# Patient Record
Sex: FEMALE | Race: WHITE | NOT HISPANIC OR LATINO | Employment: UNEMPLOYED | URBAN - METROPOLITAN AREA
[De-identification: names, ages, dates, MRNs, and addresses within clinical notes are randomized per-mention and may not be internally consistent; named-entity substitution may affect disease eponyms.]

---

## 2018-07-12 ENCOUNTER — HOSPITAL ENCOUNTER (EMERGENCY)
Facility: HOSPITAL | Age: 43
Discharge: HOME | End: 2018-07-12
Attending: EMERGENCY MEDICINE
Payer: COMMERCIAL

## 2018-07-12 VITALS
RESPIRATION RATE: 20 BRPM | WEIGHT: 180 LBS | TEMPERATURE: 99 F | HEART RATE: 78 BPM | SYSTOLIC BLOOD PRESSURE: 116 MMHG | OXYGEN SATURATION: 99 % | DIASTOLIC BLOOD PRESSURE: 67 MMHG

## 2018-07-12 DIAGNOSIS — R07.9 CHEST PAIN: ICD-10-CM

## 2018-07-12 DIAGNOSIS — O20.0 THREATENED MISCARRIAGE IN EARLY PREGNANCY: Primary | ICD-10-CM

## 2018-07-12 LAB
ABO + RH BLD: NORMAL
ALBUMIN SERPL BCP-MCNC: 4.1 G/DL
ALP SERPL-CCNC: 63 U/L
ALT SERPL W/O P-5'-P-CCNC: 19 U/L
ANION GAP SERPL CALC-SCNC: 10 MMOL/L
AST SERPL-CCNC: 17 U/L
B-HCG UR QL: POSITIVE
BACTERIA #/AREA URNS HPF: ABNORMAL /HPF
BASOPHILS # BLD AUTO: 0.1 K/UL
BASOPHILS NFR BLD: 1 %
BILIRUB SERPL-MCNC: 0.4 MG/DL
BILIRUB UR QL STRIP: ABNORMAL
BUN SERPL-MCNC: 7 MG/DL
CALCIUM SERPL-MCNC: 9 MG/DL
CHLORIDE SERPL-SCNC: 107 MMOL/L
CLARITY UR: ABNORMAL
CO2 SERPL-SCNC: 23 MMOL/L
COLOR UR: ABNORMAL
CREAT SERPL-MCNC: 0.8 MG/DL
CTP QC/QA: YES
DIFFERENTIAL METHOD: ABNORMAL
EOSINOPHIL # BLD AUTO: 0 K/UL
EOSINOPHIL NFR BLD: 0.3 %
ERYTHROCYTE [DISTWIDTH] IN BLOOD BY AUTOMATED COUNT: 17.8 %
EST. GFR  (AFRICAN AMERICAN): >60 ML/MIN/1.73 M^2
EST. GFR  (NON AFRICAN AMERICAN): >60 ML/MIN/1.73 M^2
GLUCOSE SERPL-MCNC: 85 MG/DL
GLUCOSE UR QL STRIP: ABNORMAL
HCG INTACT+B SERPL-ACNC: 923 MIU/ML
HCT VFR BLD AUTO: 39.3 %
HGB BLD-MCNC: 12.6 G/DL
HGB UR QL STRIP: ABNORMAL
HYALINE CASTS #/AREA URNS LPF: 0 /LPF
KETONES UR QL STRIP: ABNORMAL
LEUKOCYTE ESTERASE UR QL STRIP: ABNORMAL
LYMPHOCYTES # BLD AUTO: 1.8 K/UL
LYMPHOCYTES NFR BLD: 23.8 %
MCH RBC QN AUTO: 28.5 PG
MCHC RBC AUTO-ENTMCNC: 32.2 G/DL
MCV RBC AUTO: 89 FL
MICROSCOPIC COMMENT: ABNORMAL
MONOCYTES # BLD AUTO: 0.3 K/UL
MONOCYTES NFR BLD: 4.5 %
NEUTROPHILS # BLD AUTO: 5.4 K/UL
NEUTROPHILS NFR BLD: 70.4 %
NITRITE UR QL STRIP: ABNORMAL
PH UR STRIP: ABNORMAL [PH] (ref 5–8)
PLATELET # BLD AUTO: 313 K/UL
PMV BLD AUTO: 7.5 FL
POTASSIUM SERPL-SCNC: 3.6 MMOL/L
PROT SERPL-MCNC: 7 G/DL
PROT UR QL STRIP: ABNORMAL
RBC # BLD AUTO: 4.44 M/UL
RBC #/AREA URNS HPF: >100 /HPF (ref 0–4)
SODIUM SERPL-SCNC: 140 MMOL/L
SP GR UR STRIP: ABNORMAL (ref 1–1.03)
SQUAMOUS #/AREA URNS HPF: 5 /HPF
URN SPEC COLLECT METH UR: ABNORMAL
UROBILINOGEN UR STRIP-ACNC: ABNORMAL EU/DL
WBC # BLD AUTO: 7.7 K/UL
WBC #/AREA URNS HPF: 5 /HPF (ref 0–5)

## 2018-07-12 PROCEDURE — 84702 CHORIONIC GONADOTROPIN TEST: CPT

## 2018-07-12 PROCEDURE — 96360 HYDRATION IV INFUSION INIT: CPT

## 2018-07-12 PROCEDURE — 81025 URINE PREGNANCY TEST: CPT | Performed by: NURSE PRACTITIONER

## 2018-07-12 PROCEDURE — 96361 HYDRATE IV INFUSION ADD-ON: CPT

## 2018-07-12 PROCEDURE — 93005 ELECTROCARDIOGRAM TRACING: CPT

## 2018-07-12 PROCEDURE — 36415 COLL VENOUS BLD VENIPUNCTURE: CPT

## 2018-07-12 PROCEDURE — 85025 COMPLETE CBC W/AUTO DIFF WBC: CPT

## 2018-07-12 PROCEDURE — 88305 TISSUE EXAM BY PATHOLOGIST: CPT | Performed by: PATHOLOGY

## 2018-07-12 PROCEDURE — 81000 URINALYSIS NONAUTO W/SCOPE: CPT

## 2018-07-12 PROCEDURE — 88305 TISSUE EXAM BY PATHOLOGIST: CPT | Mod: 26,,, | Performed by: PATHOLOGY

## 2018-07-12 PROCEDURE — 86901 BLOOD TYPING SEROLOGIC RH(D): CPT

## 2018-07-12 PROCEDURE — 99285 EMERGENCY DEPT VISIT HI MDM: CPT | Mod: 25

## 2018-07-12 PROCEDURE — 25000003 PHARM REV CODE 250: Performed by: NURSE PRACTITIONER

## 2018-07-12 PROCEDURE — 93010 ELECTROCARDIOGRAM REPORT: CPT | Mod: ,,, | Performed by: INTERNAL MEDICINE

## 2018-07-12 PROCEDURE — 80053 COMPREHEN METABOLIC PANEL: CPT

## 2018-07-12 RX ORDER — ALPRAZOLAM 0.5 MG/1
0.5 TABLET ORAL 2 TIMES DAILY
COMMUNITY
End: 2019-10-15 | Stop reason: SDUPTHER

## 2018-07-12 RX ORDER — FLUVOXAMINE MALEATE 150 MG/1
150 CAPSULE, EXTENDED RELEASE ORAL NIGHTLY
COMMUNITY
End: 2019-09-03

## 2018-07-12 RX ADMIN — SODIUM CHLORIDE 1000 ML: 0.9 INJECTION, SOLUTION INTRAVENOUS at 06:07

## 2018-07-12 NOTE — ED PROVIDER NOTES
"Encounter Date: 7/12/2018    SCRIBE #1 NOTE: I, Rima Soto, am scribing for, and in the presence of, Snehal Pinto NP.       History     Chief Complaint   Patient presents with    Vaginal Bleeding     7 weeks pregnant. Vaginal bleeding since last night       07/12/2018  5:52 PM    Chief Complaint: Vaginal Bleeding      The patient is a 42 y.o. female who presents with vaginal bleeding with clots that began last night with associated abdominal pain. Pt is 7 weeks pregnant. She is currently dizzy and "the room is spinning". She also endorses constant chest tightness and SOB secondary to anxiety since last night. Her gynecologist is Dr. Mccallum, but she has been unable to make an appointment. LNMP was 05/22. No exacerbating or alleviating factors. Denies fever. PMHx of anxiety, depression, and anemia. PSHx of back surgery and ovarian cyst removal. Smoker.      The history is provided by the patient and the spouse.     Review of patient's allergies indicates:  No Known Allergies  Past Medical History:   Diagnosis Date    Anxiety     Depression      Past Surgical History:   Procedure Laterality Date    BACK SURGERY       History reviewed. No pertinent family history.  Social History   Substance Use Topics    Smoking status: Current Every Day Smoker     Packs/day: 0.50     Types: Cigarettes    Smokeless tobacco: Never Used    Alcohol use No     Review of Systems   Constitutional: Negative for fever.   HENT: Negative for sore throat.    Respiratory: Positive for chest tightness and shortness of breath.    Cardiovascular: Negative for chest pain.   Gastrointestinal: Positive for abdominal pain. Negative for nausea.   Genitourinary: Positive for vaginal bleeding. Negative for dysuria.   Musculoskeletal: Negative for back pain.   Skin: Negative for rash.   Neurological: Negative for weakness.   Hematological: Does not bruise/bleed easily.   Psychiatric/Behavioral: The patient is nervous/anxious.        Physical Exam "     Initial Vitals [07/12/18 1614]   BP Pulse Resp Temp SpO2   (!) 114/55 99 16 98.6 °F (37 °C) 98 %      MAP       --         Physical Exam    Nursing note and vitals reviewed.  Constitutional: Vital signs are normal. She appears well-developed and well-nourished.   HENT:   Head: Normocephalic and atraumatic.   Eyes: Pupils are equal, round, and reactive to light.   Neck: Neck supple.   Cardiovascular: Normal rate, regular rhythm, normal heart sounds and intact distal pulses. Exam reveals no gallop and no friction rub.    No murmur heard.  Pulmonary/Chest: Breath sounds normal. She has no wheezes. She has no rhonchi. She has no rales.   Abdominal: Soft. Normal appearance and bowel sounds are normal. There is no tenderness. There is no rigidity and no guarding. Hernia confirmed negative in the right inguinal area and confirmed negative in the left inguinal area.   Genitourinary: Pelvic exam was performed with patient supine. No labial fusion. There is no rash, tenderness, lesion or injury on the right labia. There is no rash, tenderness, lesion or injury on the left labia. Uterus is deviated. Uterus is not enlarged, not fixed and not tender. Cervix exhibits discharge. Cervix exhibits no motion tenderness. Right adnexum displays no mass, no tenderness and no fullness. Left adnexum displays no mass, no tenderness and no fullness. There is bleeding in the vagina. No erythema or tenderness in the vagina. No foreign body in the vagina. No signs of injury around the vagina. No vaginal discharge found.   Genitourinary Comments: Unable to visualize cervix. Blood bright red moderate amount in vaginal vault with 2 x 2 cm foreign body appears to be tissue. Possible product of conception. Able to palpate cervix. Cervical os feels closed. No CMT tenderness. No adnexal tenderness.    Lymphadenopathy:        Right: No inguinal adenopathy present.        Left: No inguinal adenopathy present.   Neurological: She is alert and  oriented to person, place, and time. She has normal strength.   Skin: Skin is warm, dry and intact.   Psychiatric: She has a normal mood and affect. Her speech is normal and behavior is normal.         ED Course   Procedures  Labs Reviewed   CBC W/ AUTO DIFFERENTIAL - Abnormal; Notable for the following:        Result Value    RDW 17.8 (*)     MPV 7.5 (*)     All other components within normal limits   URINALYSIS - Abnormal; Notable for the following:     Color, UA Red (*)     Appearance, UA Cloudy (*)     All other components within normal limits   URINALYSIS MICROSCOPIC - Abnormal; Notable for the following:     RBC, UA >100 (*)     All other components within normal limits   POCT URINE PREGNANCY - Abnormal; Notable for the following:     POC Preg Test, Ur Positive (*)     All other components within normal limits   COMPREHENSIVE METABOLIC PANEL   HCG, QUANTITATIVE, PREGNANCY   D DIMER, QUANTITATIVE   GROUP & RH   TISSUE SPECIMEN TO PATHOLOGY - SURGERY   TISSUE SPECIMEN TO PATHOLOGY - SURGERY     EKG Readings: (Independently Interpreted)   Initial Reading: No STEMI. Rhythm: Normal Sinus Rhythm. Heart Rate: 77 bpm.   No Q waves. Good R wave progression. No ST elevation or depression. No T wave inversions.       Imaging Results          US OB Less Than 14 Wks with Transvag(xpd (Final result)  Result time 07/12/18 19:09:12    Final result by Jean Paul Matamoros MD (07/12/18 19:09:12)                 Impression:      No intrauterine or extrauterine gestational sac visualized at this time.  Recommend correlation with beta hCG levels and close follow-up ultrasound.    Leiomyomatous changes of the uterus as above.    Nonvisualization of the right ovary.      Electronically signed by: Jean Paul Matamoros MD  Date:    07/12/2018  Time:    19:09             Narrative:    EXAMINATION:  US OB LESS THAN 14 WKS WITH TRANSVAGINAL (XPD)    CLINICAL HISTORY:  Vag Bleeding;    TECHNIQUE:  Transabdominal sonography of the pelvis was  performed, followed by transvaginal sonography to better evaluate the uterus and ovaries.    COMPARISON:  None.    FINDINGS:  The uterus is retroverted measuring 9.0 x 6.1 x 6.5 cm.  No intrauterine gestational sac visualized.  Peripherally calcified exophytic lesion noted along the left lateral uterine wall measuring up to 3 cm in diameter, most in keeping with a calcified fibroid.  Two smaller intramural fibroids noted near the fundus measuring 1.2 cm and 0.9 cm respectively.  Endometrial stripe measures within normal limits for a premenopausal female at 9 mm in thickness.    Right ovary: Not visualized.    Left ovary: Normal measuring 2.4 x 1.8 x 4.2 cm.  Blood flow appears normal.  No adnexal masses visualized.    Miscellaneous: No Free Fluid.                                 Medical Decision Making:   History:   Old Medical Records: I decided to obtain old medical records.  Differential Diagnosis:   Implantation bleeding  Ectopic pregnancy  Miscarriage  Independently Interpreted Test(s):   I have ordered and independently interpreted EKG Reading(s) - see prior notes  Clinical Tests:   Lab Tests: Ordered and Reviewed  Radiological Study: Ordered and Reviewed  Medical Tests: Ordered and Reviewed  ED Management:  20:38 I spoke with Dr. Hector WILSON at [facility]. She recommends to repeat beta hCG in 24 hours. No further recommendations at this time.        APC / Resident Notes:   Patient is a 42 y.o. female who presents to the ED 07/13/2018 who underwent emergent evaluation for vaginal bleeding and cramping at 7 weeks pregnant.  Positive UPT.  On pelvic exam Unable to visualize cervix. Blood bright red moderate amount in vaginal vault with 2 x 2 cm foreign body appears to be tissue. Possible product of conception. Specimen sent to lab for pathology. Able to palpate cervix. Cervical os feels closed. No CMT tenderness. No adnexal tenderness. Abdomen is soft and nontender. I cannot palpate her uterus.  Hemoglobin and  hematocrit are normal. I do not think emergent blood transfusion is indicated at this time.  Blood pressure stable. Transvaginal ultrasound reveals no intra or extra uterine pregnancy.  I do not think ectopic pregnancy.  Patient likely had or is having miscarriage.  She is Rh positive. Urinalysis and beta HCG noted. There are no signs of acute infection at this time.  There are no signs of acute infection or retained products of conception at this time.  I do not think antibiotics are indicated sign.  Case is discussed with OBGYN Dr. Young who recommends repeat beta HCG in 24 hr without further recommendations at this time.  Findings and plan of care and follow-up discussed in detail with patient.  Patient is able to verbalize that she understands that this is likely a miscarriage as well as return precautions and follow-up. Based on my clinical evaluation, I do not appreciate any immediate, emergent, or life threatening condition or etiology that warrants additional workup today and feel that the patient can be discharged with close follow up care. Case discussed with Dr. Rivera who is agreeable to plan of care. Follow up and return precautions discussed; patient verbalized understanding and is agreeable to plan of care. Patient discharged home in stable condition.             Scribe Attestation:   Scribe #1: I performed the above scribed service and the documentation accurately describes the services I performed. I attest to the accuracy of the note.    Attending Attestation:           Physician Attestation for Scribe:  Physician Attestation Statement for Scribe #1: I, Snehal Pinto, reviewed documentation, as scribed by in my presence, and it is both accurate and complete.     Comments: I, Snehal Pinto, NP-C, personally performed the services described in this documentation. All medical record entries made by the scribe were at my direction and in my presence.  I have reviewed the chart and agree that the  record reflects my personal performance and is accurate and complete. KEENAN Ponce.  12:43 AM 07/13/2018 e               Clinical Impression:   The primary encounter diagnosis was Threatened miscarriage in early pregnancy. A diagnosis of Chest pain was also pertinent to this visit.      Disposition:   Disposition: Discharged  Condition: Stable                        Snehal Pinto NP  07/13/18 0043

## 2018-07-14 ENCOUNTER — HOSPITAL ENCOUNTER (EMERGENCY)
Facility: HOSPITAL | Age: 43
Discharge: HOME | End: 2018-07-14
Attending: EMERGENCY MEDICINE
Payer: COMMERCIAL

## 2018-07-14 VITALS
RESPIRATION RATE: 17 BRPM | DIASTOLIC BLOOD PRESSURE: 63 MMHG | WEIGHT: 185 LBS | SYSTOLIC BLOOD PRESSURE: 110 MMHG | HEART RATE: 94 BPM | BODY MASS INDEX: 29.73 KG/M2 | TEMPERATURE: 98 F | OXYGEN SATURATION: 97 % | HEIGHT: 66 IN

## 2018-07-14 DIAGNOSIS — O03.9 MISCARRIAGE: Primary | ICD-10-CM

## 2018-07-14 LAB
ANION GAP SERPL CALC-SCNC: 10 MMOL/L
BASOPHILS # BLD AUTO: 0 K/UL
BASOPHILS NFR BLD: 0.3 %
BUN SERPL-MCNC: 8 MG/DL
CALCIUM SERPL-MCNC: 8.9 MG/DL
CHLORIDE SERPL-SCNC: 107 MMOL/L
CO2 SERPL-SCNC: 24 MMOL/L
CREAT SERPL-MCNC: 0.8 MG/DL
DIFFERENTIAL METHOD: ABNORMAL
EOSINOPHIL # BLD AUTO: 0 K/UL
EOSINOPHIL NFR BLD: 0.7 %
ERYTHROCYTE [DISTWIDTH] IN BLOOD BY AUTOMATED COUNT: 18.2 %
EST. GFR  (AFRICAN AMERICAN): >60 ML/MIN/1.73 M^2
EST. GFR  (NON AFRICAN AMERICAN): >60 ML/MIN/1.73 M^2
GLUCOSE SERPL-MCNC: 102 MG/DL
HCG INTACT+B SERPL-ACNC: 251 MIU/ML
HCT VFR BLD AUTO: 37.5 %
HGB BLD-MCNC: 12.1 G/DL
LYMPHOCYTES # BLD AUTO: 1.6 K/UL
LYMPHOCYTES NFR BLD: 30.3 %
MCH RBC QN AUTO: 28.6 PG
MCHC RBC AUTO-ENTMCNC: 32.3 G/DL
MCV RBC AUTO: 89 FL
MONOCYTES # BLD AUTO: 0.2 K/UL
MONOCYTES NFR BLD: 4 %
NEUTROPHILS # BLD AUTO: 3.4 K/UL
NEUTROPHILS NFR BLD: 64.7 %
PLATELET # BLD AUTO: 316 K/UL
PMV BLD AUTO: 7.1 FL
POTASSIUM SERPL-SCNC: 3.9 MMOL/L
RBC # BLD AUTO: 4.23 M/UL
SODIUM SERPL-SCNC: 141 MMOL/L
WBC # BLD AUTO: 5.3 K/UL

## 2018-07-14 PROCEDURE — 85025 COMPLETE CBC W/AUTO DIFF WBC: CPT

## 2018-07-14 PROCEDURE — 84702 CHORIONIC GONADOTROPIN TEST: CPT

## 2018-07-14 PROCEDURE — 80048 BASIC METABOLIC PNL TOTAL CA: CPT

## 2018-07-14 PROCEDURE — 63600175 PHARM REV CODE 636 W HCPCS: Performed by: PHYSICIAN ASSISTANT

## 2018-07-14 PROCEDURE — 36415 COLL VENOUS BLD VENIPUNCTURE: CPT

## 2018-07-14 PROCEDURE — 96372 THER/PROPH/DIAG INJ SC/IM: CPT

## 2018-07-14 PROCEDURE — 99284 EMERGENCY DEPT VISIT MOD MDM: CPT | Mod: 25

## 2018-07-14 RX ORDER — KETOROLAC TROMETHAMINE 30 MG/ML
30 INJECTION, SOLUTION INTRAMUSCULAR; INTRAVENOUS
Status: COMPLETED | OUTPATIENT
Start: 2018-07-14 | End: 2018-07-14

## 2018-07-14 RX ADMIN — KETOROLAC TROMETHAMINE 30 MG: 30 INJECTION, SOLUTION INTRAMUSCULAR at 02:07

## 2018-07-14 NOTE — ED PROVIDER NOTES
"Encounter Date: 7/14/2018    SCRIBE #1 NOTE: Rhea OLIVEIRA, moses scribing for, and in the presence of, Amy Gee PA-C.       History     Chief Complaint   Patient presents with    Miscarriage     seen on Thurs for suspected miscariage--told to follow up in a few days for blood tests to confirm       07/14/2018 12:22 PM     Chief complaint: Abdominal Pain; Vaginal Bleeding; Back Pain      Becky Sy is a 42 y.o. female with PMHx of anxiety and depression who presents to the ED for blood tests s/p a miscarriage to check for "decreasing pregnancy hormones". The patient currently complaints of back pain, vaginal bleeding, and "cramping" abdominal pain. She reports being seen at this ED x2 days ago (Thursday, 7/12) with the same complaints. At the time, she was given a pelvic exam which showed bright red blood in the vaginal vault and possible product of conception. She also received blood work that showed H&H levels to be normal. Transvaginal ultrasound revealed no intra or extra uterine pregnancy. She is Rh positive. Urinalysis and beta HCG were noted. The patient had a confirmed miscarriage that Thursday night after passing the what she though was the baby and placenta. The patient denies fever and states that she felt weak yesterday, but states "feeling a bit better now". She endorses taking Tylenol for pain with no improvements to pain. Dr. Mccallum is her OB/GYN, but states that she has been unable to secure an appointment. The patient has no other medical concerns or complaints at this moment. SHx includes back surgery. NKDA noted.       The history is provided by the patient.     Review of patient's allergies indicates:  No Known Allergies  Past Medical History:   Diagnosis Date    Anxiety     Depression      Past Surgical History:   Procedure Laterality Date    BACK SURGERY       History reviewed. No pertinent family history.  Social History   Substance Use Topics    Smoking status: Current " Every Day Smoker     Packs/day: 0.50     Types: Cigarettes    Smokeless tobacco: Never Used    Alcohol use No     Review of Systems   Constitutional: Negative for chills and fever.   HENT: Negative for facial swelling and trouble swallowing.    Respiratory: Negative for cough, chest tightness, shortness of breath and wheezing.    Cardiovascular: Negative for chest pain and palpitations.   Gastrointestinal: Positive for abdominal pain. Negative for abdominal distention, diarrhea, nausea and vomiting.   Genitourinary: Positive for vaginal bleeding. Negative for dysuria, hematuria and urgency.   Musculoskeletal: Positive for back pain. Negative for arthralgias, joint swelling, myalgias, neck pain and neck stiffness.   Skin: Negative for color change, pallor, rash and wound.   Neurological: Positive for weakness (resolved). Negative for dizziness, syncope, numbness and headaches.   Hematological: Does not bruise/bleed easily.   Psychiatric/Behavioral: The patient is not nervous/anxious.    All other systems reviewed and are negative.      Physical Exam     Initial Vitals [07/14/18 1204]   BP Pulse Resp Temp SpO2   110/63 94 17 98.1 °F (36.7 °C) 97 %      MAP       --         Physical Exam    Nursing note and vitals reviewed.  Constitutional: She appears well-developed and well-nourished. She is not diaphoretic. No distress.   HENT:   Head: Normocephalic and atraumatic.   Mouth/Throat: Oropharynx is clear and moist.   Eyes: Conjunctivae are normal.   Neck: Normal range of motion. Neck supple.   Cardiovascular: Normal rate, regular rhythm, normal heart sounds and intact distal pulses. Exam reveals no gallop and no friction rub.    No murmur heard.  Pulmonary/Chest: Breath sounds normal. No respiratory distress. She has no wheezes. She has no rhonchi. She has no rales.   Abdominal: Soft. She exhibits no distension and no mass. There is no tenderness.   No palpable abdominal tenderness.    Genitourinary:   Genitourinary  "Comments: Pelvic exam deferred.    Musculoskeletal: Normal range of motion. She exhibits no edema or tenderness.   Neurological: She is alert and oriented to person, place, and time. She has normal strength. No sensory deficit.   Skin: Skin is warm and dry. No rash and no abscess noted. No erythema.   Psychiatric: She has a normal mood and affect.         ED Course   Procedures  Labs Reviewed - No data to display       Imaging Results    None          Medical Decision Making:   History:   Old Medical Records: I decided to obtain old medical records.  Clinical Tests:   Lab Tests: Reviewed and Ordered       APC / Resident Notes:   Quantitative HCG has decreased to 251 from 923.  H&H is stable.  Pt declines pelvic exam today and there is no reproducible palpable abdominal tenderness at this time.  She states that she passed the "baby and placenta" at home on Thursday night.  She is discharged home and encouraged to follow-up with an OB/GYN for re-evaluation and further treatment options next week.  She voices understanding and is agreeable to the plan.  She is given specific return precautions.         Scribe Attestation:   Scribe #1: I performed the above scribed service and the documentation accurately describes the services I performed. I attest to the accuracy of the note.    Attending Attestation:     Physician Attestation Statement for NP/PA:   I discussed this assessment and plan of this patient with the NP/PA, but I did not personally examine the patient. The face to face encounter was performed by the NP/PA.    Other NP/PA Attestation Additions:    History of Present Illness: 42-year-old female presented for repeat evaluation status post possible miscarriage.    Medical Decision Making: Initial differential diagnosis included but not limited to threatened , incomplete , and complete .  I am in agreement with the physician assistant's  assessment, treatment, and plan of care. "           I, Amy Gee PA-C, personally performed the services described in this documentation. All medical record entries made by the scribe were at my direction and in my presence.  I have reviewed the chart and agree that the record reflects my personal performance and is accurate and complete. Amy Gee PA-C.  7:44 PM 07/14/2018             Clinical Impression:   The encounter diagnosis was Miscarriage.                             Amy Gee PA-C  07/14/18 1944       Hernando Klein MD  07/15/18 0885

## 2019-08-22 DIAGNOSIS — R92.8 ABNORMAL MAMMOGRAM: Primary | ICD-10-CM

## 2019-08-27 ENCOUNTER — HOSPITAL ENCOUNTER (OUTPATIENT)
Dept: RADIOLOGY | Facility: HOSPITAL | Age: 44
Discharge: HOME | End: 2019-08-27
Attending: FAMILY MEDICINE
Payer: COMMERCIAL

## 2019-08-27 VITALS — WEIGHT: 185 LBS | BODY MASS INDEX: 29.73 KG/M2 | HEIGHT: 66 IN

## 2019-08-27 DIAGNOSIS — R92.8 ABNORMAL MAMMOGRAM: ICD-10-CM

## 2019-08-27 PROCEDURE — 77065 DX MAMMO INCL CAD UNI: CPT | Mod: TC,PO,LT

## 2019-09-03 ENCOUNTER — HOSPITAL ENCOUNTER (OUTPATIENT)
Dept: RADIOLOGY | Facility: HOSPITAL | Age: 44
Discharge: HOME | End: 2019-09-03
Attending: ANESTHESIOLOGY
Payer: COMMERCIAL

## 2019-09-03 ENCOUNTER — HOSPITAL ENCOUNTER (OUTPATIENT)
Dept: PREADMISSION TESTING | Facility: HOSPITAL | Age: 44
Discharge: HOME | End: 2019-09-03
Attending: OBSTETRICS & GYNECOLOGY
Payer: COMMERCIAL

## 2019-09-03 ENCOUNTER — LAB VISIT (OUTPATIENT)
Dept: LAB | Facility: HOSPITAL | Age: 44
End: 2019-09-03
Attending: OBSTETRICS & GYNECOLOGY
Payer: COMMERCIAL

## 2019-09-03 VITALS
DIASTOLIC BLOOD PRESSURE: 65 MMHG | OXYGEN SATURATION: 100 % | RESPIRATION RATE: 18 BRPM | HEIGHT: 66 IN | BODY MASS INDEX: 24.34 KG/M2 | WEIGHT: 151.44 LBS | HEART RATE: 76 BPM | SYSTOLIC BLOOD PRESSURE: 98 MMHG | TEMPERATURE: 99 F

## 2019-09-03 DIAGNOSIS — Z01.810 PREOP CARDIOVASCULAR EXAM: Primary | ICD-10-CM

## 2019-09-03 DIAGNOSIS — Z41.9 SURGERY, ELECTIVE: ICD-10-CM

## 2019-09-03 DIAGNOSIS — Z41.9 SURGERY, ELECTIVE: Primary | ICD-10-CM

## 2019-09-03 LAB
ERYTHROCYTE [DISTWIDTH] IN BLOOD BY AUTOMATED COUNT: 14.8 % (ref 11.5–14.5)
HCG SERPL QL: NEGATIVE
HCT VFR BLD AUTO: 37 % (ref 37–48.5)
HGB BLD-MCNC: 11.8 G/DL (ref 12–16)
MCH RBC QN AUTO: 29.4 PG (ref 27–31)
MCHC RBC AUTO-ENTMCNC: 31.9 G/DL (ref 32–36)
MCV RBC AUTO: 92 FL (ref 82–98)
PLATELET # BLD AUTO: 247 K/UL (ref 150–350)
PMV BLD AUTO: 10.1 FL (ref 9.2–12.9)
RBC # BLD AUTO: 4.02 M/UL (ref 4–5.4)
WBC # BLD AUTO: 5.18 K/UL (ref 3.9–12.7)

## 2019-09-03 PROCEDURE — 93005 ELECTROCARDIOGRAM TRACING: CPT

## 2019-09-03 PROCEDURE — 71046 X-RAY EXAM CHEST 2 VIEWS: CPT | Mod: TC

## 2019-09-03 PROCEDURE — 84703 CHORIONIC GONADOTROPIN ASSAY: CPT

## 2019-09-03 PROCEDURE — 85027 COMPLETE CBC AUTOMATED: CPT

## 2019-09-03 PROCEDURE — 36415 COLL VENOUS BLD VENIPUNCTURE: CPT

## 2019-09-03 RX ORDER — NAPROXEN SODIUM 220 MG
220 TABLET ORAL
Status: ON HOLD | COMMUNITY
End: 2019-09-18 | Stop reason: HOSPADM

## 2019-09-03 RX ORDER — OXYCODONE AND ACETAMINOPHEN 5; 325 MG/1; MG/1
1 TABLET ORAL EVERY 4 HOURS PRN
COMMUNITY
End: 2020-03-10 | Stop reason: ALTCHOICE

## 2019-09-03 RX ORDER — CEFAZOLIN SODIUM 2 G/50ML
2 SOLUTION INTRAVENOUS ONCE
Status: CANCELLED | OUTPATIENT
Start: 2019-09-17

## 2019-09-03 RX ORDER — ENOXAPARIN SODIUM 100 MG/ML
40 INJECTION SUBCUTANEOUS ONCE
Status: CANCELLED | OUTPATIENT
Start: 2019-09-17

## 2019-09-03 RX ORDER — FLUTICASONE PROPIONATE 50 MCG
1 SPRAY, SUSPENSION (ML) NASAL DAILY
COMMUNITY
End: 2020-07-07 | Stop reason: SDUPTHER

## 2019-09-03 RX ORDER — METHOCARBAMOL 750 MG/1
750 TABLET, FILM COATED ORAL
COMMUNITY
End: 2020-11-13 | Stop reason: SDUPTHER

## 2019-09-03 NOTE — DISCHARGE INSTRUCTIONS
To confirm, Your doctor has instructed you that surgery is scheduled for:     Please report to [facility] on [address] the morning of surgery.     Pre-Op will call the afternoon prior to surgery between 4:00 and 6:00 PM with the final arrival time.      PLEASE NOTE:  The surgery schedule has many variables which may affect the time of your surgery case.  Family members should be available if your surgery time changes.  Plan to be here the day of your procedure between 4-6 hours.    MEDICATIONS:  TAKE ONLY THESE MEDICATIONS WITH A SMALL SIP OF WATER THE MORNING OF YOUR PROCEDURE: Xanax      DO NOT TAKE THESE MEDICATIONS 5-7 DAYS PRIOR to your procedure or per your surgeon's request: ASPIRIN, ALEVE, ADVIL, IBUPROFEN, FISH OIL VITAMIN E, HERBALS  (May take Tylenol)    ONLY if you are prescribed any types of blood thinners such as:  Aspirin, Coumadin, Plavix, Pradaxa, Xarelto, Aggrenox, Effient, Eliquis, Savasya, Brilinta, or any other, ask your surgeon whether you should stop taking them and how long before surgery you should stop.  You may also need to verify with the prescribing physician if it is ok to stop your medication.      INSTRUCTIONS IMPORTANT!!  · Do not eat or drink anything between midnight and the time of your procedure- this includes gum, mints, and candy.  · ONLY if you are diabetic, check your sugar in the morning before your procedure.  · Do not smoke, vape or drink alcoholic beverages 24 hours prior to your procedure.  · Shower the night before AND the morning of your procedure with a Chlorhexidine wash such as Hibiclens or Dial antibacterial soap from the neck down.  Do not get it on your face or in your eyes.  You may use your own shampoo and face wash. This helps your skin to be as bacteria free as possible.    · If you wear contact lenses, dentures, hearing aids or glasses, bring a container to put them in during surgery and give to a family member for safe keeping.  Please leave all  jewelry, piercing's and valuables at home.   · DO NOT remove hair from the surgery site.  Do not shave the incision site unless you are given specific instructions to do so.    · ONLY if you have been diagnosed with sleep apnea please bring your C-PAP machine.  · ONLY if you wear home oxygen please bring your portable oxygen tank the day of your procedure.   · ONLY for patients requiring bowel prep, written instructions will be given by your doctor's office.  · ONLY if you have a neuro stimulator, please bring the controller with you the morning of surgery  · ONLY if a type and screen test is needed before surgery, please return:  · If your doctor has scheduled you for an overnight stay, bring a small overnight bag with any personal items you need.  · Make arrangements in advance for transportation home by a responsible adult.  · You must make arrangements for transportation, TAXI'S, UBER'S OR LYFTS ARE NOT ALLOWED.          If you have any questions about these instructions, call Pre-Op Admit  Nursing at [phone] or the Pre-Op Day Surgery Unit at [phone].

## 2019-09-13 ENCOUNTER — LAB VISIT (OUTPATIENT)
Dept: LAB | Facility: HOSPITAL | Age: 44
End: 2019-09-13
Attending: OBSTETRICS & GYNECOLOGY
Payer: COMMERCIAL

## 2019-09-13 DIAGNOSIS — Z90.710 H/O ABDOMINAL HYSTERECTOMY: Primary | ICD-10-CM

## 2019-09-13 LAB
ABO + RH BLD: NORMAL
BLD GP AB SCN CELLS X3 SERPL QL: NORMAL

## 2019-09-13 PROCEDURE — 86850 RBC ANTIBODY SCREEN: CPT

## 2019-09-13 PROCEDURE — 36415 COLL VENOUS BLD VENIPUNCTURE: CPT

## 2019-09-17 ENCOUNTER — ANESTHESIA EVENT (OUTPATIENT)
Dept: SURGERY | Facility: HOSPITAL | Age: 44
End: 2019-09-17
Payer: COMMERCIAL

## 2019-09-17 ENCOUNTER — ANESTHESIA (OUTPATIENT)
Dept: SURGERY | Facility: HOSPITAL | Age: 44
End: 2019-09-17
Payer: COMMERCIAL

## 2019-09-17 ENCOUNTER — HOSPITAL ENCOUNTER (OUTPATIENT)
Facility: HOSPITAL | Age: 44
Discharge: HOME | End: 2019-09-18
Attending: OBSTETRICS & GYNECOLOGY | Admitting: OBSTETRICS & GYNECOLOGY
Payer: COMMERCIAL

## 2019-09-17 DIAGNOSIS — Z41.9 SURGERY, ELECTIVE: ICD-10-CM

## 2019-09-17 LAB
B-HCG UR QL: NEGATIVE
BASOPHILS # BLD AUTO: 0.01 K/UL (ref 0–0.2)
BASOPHILS NFR BLD: 0.1 % (ref 0–1.9)
CTP QC/QA: YES
DIFFERENTIAL METHOD: ABNORMAL
EOSINOPHIL # BLD AUTO: 0 K/UL (ref 0–0.5)
EOSINOPHIL NFR BLD: 0 % (ref 0–8)
ERYTHROCYTE [DISTWIDTH] IN BLOOD BY AUTOMATED COUNT: 14.9 % (ref 11.5–14.5)
HCG SERPL QL: NEGATIVE
HCT VFR BLD AUTO: 30.9 % (ref 37–48.5)
HGB BLD-MCNC: 9.8 G/DL (ref 12–16)
IMM GRANULOCYTES # BLD AUTO: 0.02 K/UL (ref 0–0.04)
IMM GRANULOCYTES NFR BLD AUTO: 0.2 % (ref 0–0.5)
LYMPHOCYTES # BLD AUTO: 0.5 K/UL (ref 1–4.8)
LYMPHOCYTES NFR BLD: 6.1 % (ref 18–48)
MCH RBC QN AUTO: 29.3 PG (ref 27–31)
MCHC RBC AUTO-ENTMCNC: 31.7 G/DL (ref 32–36)
MCV RBC AUTO: 93 FL (ref 82–98)
MONOCYTES # BLD AUTO: 0.1 K/UL (ref 0.3–1)
MONOCYTES NFR BLD: 1.1 % (ref 4–15)
NEUTROPHILS # BLD AUTO: 7.4 K/UL (ref 1.8–7.7)
NEUTROPHILS NFR BLD: 92.5 % (ref 38–73)
NRBC BLD-RTO: 0 /100 WBC
PLATELET # BLD AUTO: 256 K/UL (ref 150–350)
PMV BLD AUTO: 10.3 FL (ref 9.2–12.9)
RBC # BLD AUTO: 3.34 M/UL (ref 4–5.4)
WBC # BLD AUTO: 8.03 K/UL (ref 3.9–12.7)

## 2019-09-17 PROCEDURE — G0378 HOSPITAL OBSERVATION PER HR: HCPCS

## 2019-09-17 PROCEDURE — 63600175 PHARM REV CODE 636 W HCPCS: Performed by: NURSE ANESTHETIST, CERTIFIED REGISTERED

## 2019-09-17 PROCEDURE — 84703 CHORIONIC GONADOTROPIN ASSAY: CPT

## 2019-09-17 PROCEDURE — 63600175 PHARM REV CODE 636 W HCPCS: Performed by: OBSTETRICS & GYNECOLOGY

## 2019-09-17 PROCEDURE — 25000003 PHARM REV CODE 250: Performed by: NURSE ANESTHETIST, CERTIFIED REGISTERED

## 2019-09-17 PROCEDURE — 27201107 HC STYLET, STANDARD: Performed by: NURSE ANESTHETIST, CERTIFIED REGISTERED

## 2019-09-17 PROCEDURE — 00944 ANES VAG PX VAG HYSTERECTOMY: CPT | Performed by: OBSTETRICS & GYNECOLOGY

## 2019-09-17 PROCEDURE — 27201423 OPTIME MED/SURG SUP & DEVICES STERILE SUPPLY: Performed by: OBSTETRICS & GYNECOLOGY

## 2019-09-17 PROCEDURE — S0028 INJECTION, FAMOTIDINE, 20 MG: HCPCS | Performed by: NURSE ANESTHETIST, CERTIFIED REGISTERED

## 2019-09-17 PROCEDURE — 71000039 HC RECOVERY, EACH ADD'L HOUR: Performed by: OBSTETRICS & GYNECOLOGY

## 2019-09-17 PROCEDURE — 85025 COMPLETE CBC W/AUTO DIFF WBC: CPT

## 2019-09-17 PROCEDURE — 25000003 PHARM REV CODE 250: Performed by: OBSTETRICS & GYNECOLOGY

## 2019-09-17 PROCEDURE — 71000033 HC RECOVERY, INTIAL HOUR: Performed by: OBSTETRICS & GYNECOLOGY

## 2019-09-17 PROCEDURE — 36000710: Performed by: OBSTETRICS & GYNECOLOGY

## 2019-09-17 PROCEDURE — 63600175 PHARM REV CODE 636 W HCPCS: Performed by: STUDENT IN AN ORGANIZED HEALTH CARE EDUCATION/TRAINING PROGRAM

## 2019-09-17 PROCEDURE — 12000002 HC ACUTE/MED SURGE SEMI-PRIVATE ROOM

## 2019-09-17 PROCEDURE — 37000009 HC ANESTHESIA EA ADD 15 MINS: Performed by: OBSTETRICS & GYNECOLOGY

## 2019-09-17 PROCEDURE — 37000008 HC ANESTHESIA 1ST 15 MINUTES: Performed by: OBSTETRICS & GYNECOLOGY

## 2019-09-17 PROCEDURE — 27000080 OPTIME MED/SURG SUP & DEVICES GENERAL CLASSIFICATION: Performed by: OBSTETRICS & GYNECOLOGY

## 2019-09-17 PROCEDURE — 94799 UNLISTED PULMONARY SVC/PX: CPT

## 2019-09-17 PROCEDURE — 99900035 HC TECH TIME PER 15 MIN (STAT)

## 2019-09-17 PROCEDURE — 25000003 PHARM REV CODE 250: Performed by: STUDENT IN AN ORGANIZED HEALTH CARE EDUCATION/TRAINING PROGRAM

## 2019-09-17 PROCEDURE — 27000673 HC TUBING BLOOD Y: Performed by: NURSE ANESTHETIST, CERTIFIED REGISTERED

## 2019-09-17 PROCEDURE — 36415 COLL VENOUS BLD VENIPUNCTURE: CPT

## 2019-09-17 PROCEDURE — 25000003 PHARM REV CODE 250: Performed by: ANESTHESIOLOGY

## 2019-09-17 PROCEDURE — 81025 URINE PREGNANCY TEST: CPT | Performed by: OBSTETRICS & GYNECOLOGY

## 2019-09-17 PROCEDURE — 36000711: Performed by: OBSTETRICS & GYNECOLOGY

## 2019-09-17 RX ORDER — DIPHENHYDRAMINE HYDROCHLORIDE 50 MG/ML
25 INJECTION INTRAMUSCULAR; INTRAVENOUS EVERY 6 HOURS PRN
Status: DISCONTINUED | OUTPATIENT
Start: 2019-09-17 | End: 2019-09-17 | Stop reason: HOSPADM

## 2019-09-17 RX ORDER — IBUPROFEN 200 MG
600 TABLET ORAL EVERY 6 HOURS PRN
Status: DISCONTINUED | OUTPATIENT
Start: 2019-09-18 | End: 2019-09-19 | Stop reason: HOSPADM

## 2019-09-17 RX ORDER — ONDANSETRON 2 MG/ML
INJECTION INTRAMUSCULAR; INTRAVENOUS
Status: DISCONTINUED | OUTPATIENT
Start: 2019-09-17 | End: 2019-09-17

## 2019-09-17 RX ORDER — VASOPRESSIN 20 [USP'U]/ML
INJECTION, SOLUTION INTRAMUSCULAR; SUBCUTANEOUS
Status: DISCONTINUED | OUTPATIENT
Start: 2019-09-17 | End: 2019-09-17 | Stop reason: HOSPADM

## 2019-09-17 RX ORDER — OXYCODONE HYDROCHLORIDE 5 MG/1
5 TABLET ORAL
Status: DISCONTINUED | OUTPATIENT
Start: 2019-09-17 | End: 2019-09-17 | Stop reason: HOSPADM

## 2019-09-17 RX ORDER — ROCURONIUM BROMIDE 10 MG/ML
INJECTION, SOLUTION INTRAVENOUS
Status: DISCONTINUED | OUTPATIENT
Start: 2019-09-17 | End: 2019-09-17

## 2019-09-17 RX ORDER — PROPOFOL 10 MG/ML
VIAL (ML) INTRAVENOUS
Status: DISCONTINUED | OUTPATIENT
Start: 2019-09-17 | End: 2019-09-17

## 2019-09-17 RX ORDER — HYDROMORPHONE HYDROCHLORIDE 1 MG/ML
0.2 INJECTION, SOLUTION INTRAMUSCULAR; INTRAVENOUS; SUBCUTANEOUS EVERY 5 MIN PRN
Status: DISCONTINUED | OUTPATIENT
Start: 2019-09-17 | End: 2019-09-17 | Stop reason: HOSPADM

## 2019-09-17 RX ORDER — LIDOCAINE HCL/PF 100 MG/5ML
SYRINGE (ML) INTRAVENOUS
Status: DISCONTINUED | OUTPATIENT
Start: 2019-09-17 | End: 2019-09-17

## 2019-09-17 RX ORDER — DEXAMETHASONE SODIUM PHOSPHATE 4 MG/ML
INJECTION, SOLUTION INTRA-ARTICULAR; INTRALESIONAL; INTRAMUSCULAR; INTRAVENOUS; SOFT TISSUE
Status: DISCONTINUED | OUTPATIENT
Start: 2019-09-17 | End: 2019-09-17

## 2019-09-17 RX ORDER — ENOXAPARIN SODIUM 100 MG/ML
INJECTION SUBCUTANEOUS
Status: DISCONTINUED | OUTPATIENT
Start: 2019-09-17 | End: 2019-09-17 | Stop reason: HOSPADM

## 2019-09-17 RX ORDER — CEFAZOLIN SODIUM 2 G/50ML
2 SOLUTION INTRAVENOUS ONCE
Status: DISCONTINUED | OUTPATIENT
Start: 2019-09-17 | End: 2019-09-17 | Stop reason: HOSPADM

## 2019-09-17 RX ORDER — SUCCINYLCHOLINE CHLORIDE 20 MG/ML
INJECTION INTRAMUSCULAR; INTRAVENOUS
Status: DISCONTINUED | OUTPATIENT
Start: 2019-09-17 | End: 2019-09-17

## 2019-09-17 RX ORDER — FAMOTIDINE 10 MG/ML
INJECTION INTRAVENOUS
Status: DISCONTINUED | OUTPATIENT
Start: 2019-09-17 | End: 2019-09-17

## 2019-09-17 RX ORDER — GABAPENTIN 300 MG/1
300 CAPSULE ORAL ONCE
Status: COMPLETED | OUTPATIENT
Start: 2019-09-17 | End: 2019-09-17

## 2019-09-17 RX ORDER — ONDANSETRON 2 MG/ML
4 INJECTION INTRAMUSCULAR; INTRAVENOUS DAILY PRN
Status: DISCONTINUED | OUTPATIENT
Start: 2019-09-17 | End: 2019-09-17 | Stop reason: HOSPADM

## 2019-09-17 RX ORDER — SIMETHICONE 80 MG
80 TABLET,CHEWABLE ORAL EVERY 4 HOURS PRN
Status: DISCONTINUED | OUTPATIENT
Start: 2019-09-17 | End: 2019-09-19 | Stop reason: HOSPADM

## 2019-09-17 RX ORDER — DIPHENHYDRAMINE HYDROCHLORIDE 50 MG/ML
INJECTION INTRAMUSCULAR; INTRAVENOUS
Status: DISCONTINUED | OUTPATIENT
Start: 2019-09-17 | End: 2019-09-17

## 2019-09-17 RX ORDER — FENTANYL CITRATE 50 UG/ML
INJECTION, SOLUTION INTRAMUSCULAR; INTRAVENOUS
Status: DISCONTINUED | OUTPATIENT
Start: 2019-09-17 | End: 2019-09-17

## 2019-09-17 RX ORDER — MIDAZOLAM HYDROCHLORIDE 1 MG/ML
INJECTION INTRAMUSCULAR; INTRAVENOUS
Status: DISCONTINUED | OUTPATIENT
Start: 2019-09-17 | End: 2019-09-17

## 2019-09-17 RX ORDER — SODIUM CHLORIDE, SODIUM LACTATE, POTASSIUM CHLORIDE, CALCIUM CHLORIDE 600; 310; 30; 20 MG/100ML; MG/100ML; MG/100ML; MG/100ML
INJECTION, SOLUTION INTRAVENOUS CONTINUOUS
Status: DISCONTINUED | OUTPATIENT
Start: 2019-09-17 | End: 2019-09-19 | Stop reason: HOSPADM

## 2019-09-17 RX ORDER — ENOXAPARIN SODIUM 100 MG/ML
40 INJECTION SUBCUTANEOUS ONCE
Status: COMPLETED | OUTPATIENT
Start: 2019-09-17 | End: 2019-09-17

## 2019-09-17 RX ORDER — MEPERIDINE HYDROCHLORIDE 50 MG/ML
50 INJECTION INTRAMUSCULAR; INTRAVENOUS; SUBCUTANEOUS
Status: DISPENSED | OUTPATIENT
Start: 2019-09-17 | End: 2019-09-18

## 2019-09-17 RX ORDER — HYDROMORPHONE HYDROCHLORIDE 1 MG/ML
0.2 INJECTION, SOLUTION INTRAMUSCULAR; INTRAVENOUS; SUBCUTANEOUS EVERY 5 MIN PRN
Status: COMPLETED | OUTPATIENT
Start: 2019-09-17 | End: 2019-09-17

## 2019-09-17 RX ORDER — SODIUM CHLORIDE, SODIUM LACTATE, POTASSIUM CHLORIDE, CALCIUM CHLORIDE 600; 310; 30; 20 MG/100ML; MG/100ML; MG/100ML; MG/100ML
INJECTION, SOLUTION INTRAVENOUS CONTINUOUS PRN
Status: DISCONTINUED | OUTPATIENT
Start: 2019-09-17 | End: 2019-09-17

## 2019-09-17 RX ADMIN — SODIUM CHLORIDE, SODIUM LACTATE, POTASSIUM CHLORIDE, AND CALCIUM CHLORIDE: .6; .31; .03; .02 INJECTION, SOLUTION INTRAVENOUS at 08:09

## 2019-09-17 RX ADMIN — HYDROMORPHONE HYDROCHLORIDE 0.2 MG: 1 INJECTION, SOLUTION INTRAMUSCULAR; INTRAVENOUS; SUBCUTANEOUS at 11:09

## 2019-09-17 RX ADMIN — ROCURONIUM BROMIDE 5 MG: 10 INJECTION, SOLUTION INTRAVENOUS at 07:09

## 2019-09-17 RX ADMIN — LIDOCAINE HYDROCHLORIDE 100 MG: 20 INJECTION, SOLUTION INTRAVENOUS at 07:09

## 2019-09-17 RX ADMIN — HYDROMORPHONE HYDROCHLORIDE 0.2 MG: 1 INJECTION, SOLUTION INTRAMUSCULAR; INTRAVENOUS; SUBCUTANEOUS at 12:09

## 2019-09-17 RX ADMIN — OXYCODONE HYDROCHLORIDE 5 MG: 5 TABLET ORAL at 11:09

## 2019-09-17 RX ADMIN — MEPERIDINE HYDROCHLORIDE 50 MG: 50 INJECTION INTRAMUSCULAR; INTRAVENOUS; SUBCUTANEOUS at 08:09

## 2019-09-17 RX ADMIN — SUCCINYLCHOLINE CHLORIDE 140 MG: 20 INJECTION, SOLUTION INTRAMUSCULAR; INTRAVENOUS at 07:09

## 2019-09-17 RX ADMIN — GABAPENTIN 300 MG: 300 CAPSULE ORAL at 06:09

## 2019-09-17 RX ADMIN — ROCURONIUM BROMIDE 30 MG: 10 INJECTION, SOLUTION INTRAVENOUS at 07:09

## 2019-09-17 RX ADMIN — ONDANSETRON 4 MG: 2 INJECTION INTRAMUSCULAR; INTRAVENOUS at 07:09

## 2019-09-17 RX ADMIN — SODIUM CHLORIDE, SODIUM LACTATE, POTASSIUM CHLORIDE, AND CALCIUM CHLORIDE: .6; .31; .03; .02 INJECTION, SOLUTION INTRAVENOUS at 11:09

## 2019-09-17 RX ADMIN — DIPHENHYDRAMINE HYDROCHLORIDE 6.25 MG: 50 INJECTION, SOLUTION INTRAMUSCULAR; INTRAVENOUS at 08:09

## 2019-09-17 RX ADMIN — FENTANYL CITRATE 50 MCG: 50 INJECTION INTRAMUSCULAR; INTRAVENOUS at 07:09

## 2019-09-17 RX ADMIN — MIDAZOLAM HYDROCHLORIDE 2 MG: 1 INJECTION, SOLUTION INTRAMUSCULAR; INTRAVENOUS at 07:09

## 2019-09-17 RX ADMIN — ENOXAPARIN SODIUM 40 MG: 100 INJECTION SUBCUTANEOUS at 08:09

## 2019-09-17 RX ADMIN — MEPERIDINE HYDROCHLORIDE 50 MG: 50 INJECTION INTRAMUSCULAR; INTRAVENOUS; SUBCUTANEOUS at 04:09

## 2019-09-17 RX ADMIN — DEXAMETHASONE SODIUM PHOSPHATE 8 MG: 4 INJECTION, SOLUTION INTRAMUSCULAR; INTRAVENOUS at 08:09

## 2019-09-17 RX ADMIN — SUGAMMADEX 200 MG: 100 INJECTION, SOLUTION INTRAVENOUS at 10:09

## 2019-09-17 RX ADMIN — FAMOTIDINE 20 MG: 10 INJECTION, SOLUTION INTRAVENOUS at 07:09

## 2019-09-17 RX ADMIN — ROCURONIUM BROMIDE 15 MG: 10 INJECTION, SOLUTION INTRAVENOUS at 09:09

## 2019-09-17 RX ADMIN — SODIUM CHLORIDE, SODIUM LACTATE, POTASSIUM CHLORIDE, AND CALCIUM CHLORIDE: .6; .31; .03; .02 INJECTION, SOLUTION INTRAVENOUS at 09:09

## 2019-09-17 RX ADMIN — PROPOFOL 150 MG: 10 INJECTION, EMULSION INTRAVENOUS at 07:09

## 2019-09-17 RX ADMIN — SODIUM CHLORIDE, SODIUM LACTATE, POTASSIUM CHLORIDE, AND CALCIUM CHLORIDE: .6; .31; .03; .02 INJECTION, SOLUTION INTRAVENOUS at 07:09

## 2019-09-17 NOTE — OP NOTE
Preop diagnosis-menorrhagia, dysmenorrhea, fibroids   postop diagnosis-the same   procedure-laparoscopic-assisted vaginal hysterectomy with bilateral salpingectomy  Surgeon-Isabella Turpin  Assistant - Mercedes Camacho  Anesthesia - general  Complications - none  EBL - 350 ml  Findings - the vulva vagina and cervix appeared grossly normal. The uterus was approximately 10 week size with a 4 cm fibroid in the left lower uterine segment.  The fallopian tubes appeared grossly normal.  The right ovary appeared grossly normal.  The left ovary had a small 1-2 cm cyst which when ruptured was a chocolate cyst.  The left ovary was also adherent to the left uterine body.  There was no evidence of peritoneal endometriosis.    Procedure in detail-the risks benefits indications and alternatives of the procedure were discussed with the patient and informed consent was obtained.  She was taken to the operating room where general anesthesia was obtained without difficulty.  She was prepped and draped in normal sterile fashion in the dorsal lithotomy position using the Kris stirrups and a Anaya catheter was placed. A speculum was placed in the patient's vagina and anterior lip of the cervix was grasped with a single-tooth tenaculum.  A acorn manipulator was placed into the cervix and the speculum was removed. A 10 mm skin incision was then made below the umbilicus using the scalpel and the Veress needle was inserted through this incision. Placement was confirmed by a drop of water fell easily through the needle and low pressure upon insufflation.  The abdominal cavity was then insufflated to a pressure of 15 and the Veress needle was removed. A 10 mm port was placed through this incision and confirmed by the scope.  5 mm ports were then placed in the patient's right lower quadrant and left lower quadrant under direct visualization with the scope.  The ligature device was then used to ligate and divide the mesosalpinx between the  fallopian tube and ovary.  The utero-ovarian ligaments were then ligated and divided using the LigaSure device.  On the left side, the LigaSure device was used to divide the ovary from the uterus on that side.  In this process a small chocolate cyst ruptured.  The round ligaments were then ligated and divided using the LigaSure device.  The vesicouterine peritoneum was incised toward the midline from each side and the bladder flap was made using a combination of hydrodissection and gentle blunt dissection using Kittner.  There was bleeding around the area of the anterior fibroid and 5 mL of a dilute Pitressin solution was injected into the fibroid.  Excellent hemostasis was seen.  Laparoscopic instruments were removed and attention was turned to the vagina.  A weighted speculum was placed into the vagina and the acorn manipulator was removed.  The single-tooth tenaculum was removed and replaced with Soco tenaculum site on the anterior and posterior lip of the cervix.  Approximately 10 mL of a Pitressin solution was injected into the parametrial tissue, avoiding the vessels at 3:00 a.m. and 9:00 a.m..  An incision was then made into the vaginal mucosa around the cervix and the parametrial tissue was gently pushed away from the cervix using blunt dissection with a Ray-Wicho.  Posteriorly the cul-de-sac was entered using the Metzenbaum scissors and the long weighted speculum was placed into this space. The uterosacral ligaments were then grasped with curved Edgardo clamps, , excised with the curved Ritchie scissors and suture-ligated using 0 Vicryl stay sutures.  Excellent hemostasis was seen.  The vesicouterine peritoneum was then opened using the Metzenbaum scissors and a long right angle retractor was placed into this space to deflect the bladder away from the uterus.  The cardinal ligaments and the uterine arteries were clamped cut and suture ligated with 0 Vicryl ties and excellent hemostasis was seen.  The uterus was  then flipped posteriorly and the remaining segments of the broad ligament that was still attached were clamped cut and suture ligated.  The uterus with the fallopian tubes was handed off for permanent pathologic specimen.  The pedicles were checked for hemostasis and the uterosacral ligaments were transfixed to the ipsilateral vaginal cuff angles.  The remainder of the vaginal cuff was closed using 0 Vicryl figure-of-eight sutures and excellent hemostasis was seen.  A vaginal packing of moist and Kerlix was placed and all instruments and retractors were removed from the vagina.  Gowns and gloves were changed and attention was turned to the abdomen. The abdomen was reinflated with CO2 gas to a pressure of 15.  The pelvis was irrigated copiously with warm normal saline and there were several areas along the vaginal cuff that were seen to still be bleeding.  The LigaSure device was used for hemostasis. Surgicel was then placed over the cuff and pressure was held for 5 min.  Dannielle powder was also spread throughout the pelvis.  The pressure was then turned down to 5 and there were several areas still noted to be on the vaginal cuff and the edges of the vesicouterine peritoneum.  Again the LigaSure device was used in these areas.  Surgicel was placed back over the vaginal cuff and Dannielle powder was applied again.  The area was observed at a pressure of 5 for approximately 5 min and no active bleeding was noted. All instruments and ports were then removed from the abdomen and pelvis and the abdomen was deflated.  The ports were removed under direct visualization. The 10 mm incision below the umbilicus was closed using a 0 Vicryl figure-of-eight suture.  All skin incisions were closed using 4 0 Monocryl in a subcuticular fashion. The patient tolerated procedure well. Sponge lap needle and instrument counts were correct x2.  She was taken to recovery in stable condition.

## 2019-09-17 NOTE — ANESTHESIA POSTPROCEDURE EVALUATION
Anesthesia Post Evaluation    Patient: Becky Sy    Procedure(s) Performed: Procedure(s) (LRB):  HYSTERECTOMY, VAGINAL (N/A)  HYSTERECTOMY, VAGINAL, LAPAROSCOPY-ASSISTED    Final Anesthesia Type: general  Patient location during evaluation: PACU  Patient participation: Yes- Able to Participate  Level of consciousness: awake and alert  Post-procedure vital signs: reviewed and stable  Pain management: adequate  Airway patency: patent  PONV status at discharge: No PONV  Anesthetic complications: no      Cardiovascular status: hemodynamically stable  Respiratory status: unassisted, spontaneous ventilation and room air  Hydration status: euvolemic  Follow-up not needed.          Vitals Value Taken Time   /65 9/17/2019 12:15 PM   Temp 36.6 °C (97.8 °F) 9/17/2019 12:15 PM   Pulse 69 9/17/2019 12:25 PM   Resp 12 9/17/2019 12:25 PM   SpO2 94 % 9/17/2019 12:25 PM   Vitals shown include unvalidated device data.      No case tracking events are documented in the log.      Pain/Rin Score: Pain Rating Prior to Med Admin: 5 (9/17/2019 12:20 PM)  Rin Score: 10 (9/17/2019 12:00 PM)

## 2019-09-17 NOTE — PLAN OF CARE
Pt educated on procedure, fall prevention and pain management.  Pt encouraged to verbalize any questions or concerns.

## 2019-09-17 NOTE — TRANSFER OF CARE
"Anesthesia Transfer of Care Note    Patient: Becky Sy    Procedure(s) Performed: Procedure(s) (LRB):  HYSTERECTOMY, VAGINAL (N/A)  HYSTERECTOMY, VAGINAL, LAPAROSCOPY-ASSISTED    Patient location: PACU    Anesthesia Type: general    Transport from OR: Transported from OR on room air with adequate spontaneous ventilation    Post pain: adequate analgesia    Post assessment: no apparent anesthetic complications    Post vital signs: stable    Level of consciousness: awake, alert and oriented    Nausea/Vomiting: no nausea/vomiting    Complications: none    Transfer of care protocol was followed      Last vitals:   Visit Vitals  BP (!) 109/55 (BP Location: Right arm, Patient Position: Lying)   Pulse 80   Temp 36.2 °C (97.2 °F) (Skin)   Resp 16   Ht 5' 6" (1.676 m)   Wt 68.7 kg (151 lb 7.3 oz)   LMP 09/10/2019   SpO2 100%   Breastfeeding? No   BMI 24.45 kg/m²     "

## 2019-09-17 NOTE — ANESTHESIA PREPROCEDURE EVALUATION
09/17/2019  Becky Sy is a 43 y.o., female.    Anesthesia Evaluation    I have reviewed the Patient Summary Reports.     I have reviewed the Medications.     Review of Systems  Anesthesia Hx:  Denies Family Hx of Anesthesia complications.   Denies Personal Hx of Anesthesia complications.   Social:  Smoker    Hematology/Oncology:  Hematology Normal   Oncology Normal     EENT/Dental:  EENT/Dental Normal Awaiting crown left lower molar   Cardiovascular:   Dysrhythmias (h/o V Tach 2008. Cardiac w/u neg.  resolved and off meds for years now.)     Pulmonary:   Recent URI, resolved    Renal/:  Renal/ Normal     Hepatic/GI:  Hepatic/GI Normal    Musculoskeletal:   Percocet yesterday for pulled muscle in neck. Today residual soreness. Spine Disorders: (h/o chronic LBP and BUZZ's and sciatica.  Better now with CBD oil.) lumbar    Neurological:  Neurology Normal    Endocrine:  Endocrine Normal    Dermatological:  Skin Normal    Psych:  Psychiatric Normal           Physical Exam  General:  Well nourished    Airway/Jaw/Neck:  Airway Findings: Mouth Opening: Normal Tongue: Normal  Mallampati: III  TM Distance: Normal, at least 6 cm  Jaw/Neck Findings:  Neck ROM: Normal ROM      Dental:  Dental Findings: In tact   Chest/Lungs:  Chest/Lungs Findings: Clear to auscultation     Heart/Vascular:  Heart Findings: Rate: Normal  Rhythm: Regular Rhythm  Sounds: Normal  Heart murmur: negative       Mental Status:  Mental Status Findings:  Cooperative, Alert and Oriented         Anesthesia Plan  Type of Anesthesia, risks & benefits discussed:  Anesthesia Type:  general  Patient's Preference:   Intra-op Monitoring Plan: standard ASA monitors  Intra-op Monitoring Plan Comments:   Post Op Pain Control Plan: multimodal analgesia  Post Op Pain Control Plan Comments:   Induction:   IV  Beta Blocker:  Patient is not currently on  a Beta-Blocker (No further documentation required).       Informed Consent: Patient understands risks and agrees with Anesthesia plan.  Questions answered. Anesthesia consent signed with patient.  ASA Score: 2     Day of Surgery Review of History & Physical:        Anesthesia Plan Notes: GETA  Zofran, Decadron 8, Benadryl 12.5  Ofirmev, gabapentin 200  Consider Toradol in PACU          Ready For Surgery From Anesthesia Perspective.

## 2019-09-17 NOTE — PLAN OF CARE
Transported to Room 2118 via stretcher in stable condition.  Dressing site D&I.  Pain 4-5/10 and tolerable.  Anaya to gravity draining clear yellow urine and attached to left thigh with stat lock.  Transferred to bed without incident.  Family at bedside.  Report to JACQUELINE Alan

## 2019-09-17 NOTE — PLAN OF CARE
Arrived to PACU s/p laparoscopic vaginal hysterectomy. Pt has 3 lap sites closed with dermabond/exofin and has abdominal binder in place.  No draiange noted.  Anaya catheter to gravity drainage with clear yellow urine.  Stat lock to left thigh.  Pain rated 8/10.  Feels like she can;'t swallow.  Paged anesthesiologist.

## 2019-09-17 NOTE — PLAN OF CARE
09/17/19 1341   Patient Assessment/Suction   Level of Consciousness (AVPU) alert   Respiratory Effort Normal;Unlabored   Expansion/Accessory Muscles/Retractions no retractions;no use of accessory muscles   Cough Type good;nonproductive   PRE-TX-O2   O2 Device (Oxygen Therapy) room air   SpO2 97 %   Pulse 77   Resp 18   Incentive Spirometer   $ Incentive Spirometer Charges done with encouragement;proper technique demonstrated   Incentive Spirometer Predicted Level (mL) 2600   Administration (IS) instruction provided, initial;mouthpiece;proper technique demonstrated   Number of Repetitions (IS) 10   Level Incentive Spirometer (mL) 2250   Patient Tolerance (IS) good

## 2019-09-18 VITALS
BODY MASS INDEX: 24.34 KG/M2 | HEART RATE: 99 BPM | SYSTOLIC BLOOD PRESSURE: 100 MMHG | TEMPERATURE: 99 F | WEIGHT: 151.44 LBS | OXYGEN SATURATION: 98 % | HEIGHT: 66 IN | DIASTOLIC BLOOD PRESSURE: 66 MMHG | RESPIRATION RATE: 18 BRPM

## 2019-09-18 PROCEDURE — G0378 HOSPITAL OBSERVATION PER HR: HCPCS

## 2019-09-18 PROCEDURE — 25000003 PHARM REV CODE 250: Performed by: SPECIALIST

## 2019-09-18 PROCEDURE — 63600175 PHARM REV CODE 636 W HCPCS: Performed by: OBSTETRICS & GYNECOLOGY

## 2019-09-18 PROCEDURE — 94761 N-INVAS EAR/PLS OXIMETRY MLT: CPT

## 2019-09-18 PROCEDURE — 25000003 PHARM REV CODE 250: Performed by: OBSTETRICS & GYNECOLOGY

## 2019-09-18 RX ORDER — OXYCODONE AND ACETAMINOPHEN 5; 325 MG/1; MG/1
1 TABLET ORAL EVERY 4 HOURS PRN
Status: DISCONTINUED | OUTPATIENT
Start: 2019-09-18 | End: 2019-09-19 | Stop reason: HOSPADM

## 2019-09-18 RX ORDER — IBUPROFEN 600 MG/1
600 TABLET ORAL EVERY 6 HOURS PRN
Refills: 0 | COMMUNITY
Start: 2019-09-18 | End: 2023-08-09

## 2019-09-18 RX ADMIN — SODIUM CHLORIDE, SODIUM LACTATE, POTASSIUM CHLORIDE, AND CALCIUM CHLORIDE: .6; .31; .03; .02 INJECTION, SOLUTION INTRAVENOUS at 02:09

## 2019-09-18 RX ADMIN — OXYCODONE AND ACETAMINOPHEN 1 TABLET: 5; 325 TABLET ORAL at 11:09

## 2019-09-18 RX ADMIN — SIMETHICONE 80 MG: 80 TABLET, CHEWABLE ORAL at 02:09

## 2019-09-18 RX ADMIN — SIMETHICONE 80 MG: 80 TABLET, CHEWABLE ORAL at 06:09

## 2019-09-18 RX ADMIN — MEPERIDINE HYDROCHLORIDE 50 MG: 50 INJECTION INTRAMUSCULAR; INTRAVENOUS; SUBCUTANEOUS at 02:09

## 2019-09-18 NOTE — CARE UPDATE
09/17/19 2021   PRE-TX-O2   O2 Device (Oxygen Therapy) room air   SpO2 97 %   Pulse Oximetry Type Intermittent   Pulse 85   Resp 20   Incentive Spirometer   $ Incentive Spirometer Charges done with encouragement   Incentive Spirometer Predicted Level (mL) 2600   Administration (IS) instruction provided, follow-up   Number of Repetitions (IS) 5   Level Incentive Spirometer (mL) 3000   Patient Tolerance (IS) good

## 2019-09-18 NOTE — PROGRESS NOTES
[facility]s [facility]  Progress Note    Patient Name: Becky Sy  MRN: [id number]  Admission Date: 9/17/2019  Primary Care Provider: Pili Aguirre MD  Principal Problem: <principal problem not specified>    Subjective:     HPI:  No notes on file    Interval History: pt s/p LAVH/BSO.  Pt eating without difficulty, pain well controlled. No flatus yet. VSS, AFP.   over the last 2 hours.      PE   Gen - NAD  Abdomen - binder in place, dry  Ext - scd's bilateral lower extremity    Scheduled Meds:  Continuous Infusions:   lactated ringers 125 mL/hr at 09/17/19 1145     PRN Meds:[START ON 9/18/2019] ibuprofen, meperidine, promethazine (PHENERGAN) IVPB, simethicone    Review of patient's allergies indicates:  No Known Allergies    Objective:     Vital Signs (Most Recent):  Temp: 98.6 °F (37 °C) (09/17/19 1720)  Pulse: 87 (09/17/19 1720)  Resp: 18 (09/17/19 1720)  BP: (!) 101/56 (09/17/19 1720)  SpO2: 99 % (09/17/19 1720) Vital Signs (24h Range):  Temp:  [97.1 °F (36.2 °C)-98.9 °F (37.2 °C)] 98.6 °F (37 °C)  Pulse:  [] 87  Resp:  [12-20] 18  SpO2:  [95 %-100 %] 99 %  BP: ()/(52-67) 101/56     Weight: 68.7 kg (151 lb 7.3 oz)  Body mass index is 24.45 kg/m².  Patient's last menstrual period was 09/10/2019.    I&O (Last 24H):    Intake/Output Summary (Last 24 hours) at 9/17/2019 1929  Last data filed at 9/17/2019 1800  Gross per 24 hour   Intake 2375 ml   Output 1780 ml   Net 595 ml           Laboratory:  CBC:   Recent Labs   Lab 09/17/19  1500   WBC 8.03   RBC 3.34*   HGB 9.8*   HCT 30.9*      MCV 93   MCH 29.3   MCHC 31.7*           Assessment/Plan:     Surgery, elective  S/p LAVH/BSO  Doing well    Continue post op care  Vag packing out now        Isabella Turpin MD  Obstetrics & Gynecology  [facility]

## 2019-09-18 NOTE — DISCHARGE SUMMARY
[facility]  Obstetrics & Gynecology  Discharge Summary    Patient Name: Becky Sy  MRN: [id number]  Admission Date: 2019  Hospital Length of Stay: 1 days  Discharge Date and Time:  2019 12:49 PM  Attending Physician: Isabella Turpin MD   Discharging Provider: Mercedes Camacho MD  Primary Care Provider: Pili Aguirre MD    HPI:  No notes on file    Hospital Course:  Postoperative day 1.  Laparoscopic assisted vaginal hysterectomy  Blood pressures have been running a little bit lobe most recently blood pressures have been 90s over 50s.  Pulse is 70s to 80s.  Urine output has been excellent.  Most recently 400 cc spontaneously voided.  Patient has passed just a small amount of flatus.  Patient and  have multiple questions regarding postoperative care.    Abdomen nondistended bowel sounds audible  Dressings intact  No Homans sign    Procedure(s) (LRB):  HYSTERECTOMY, VAGINAL, LAPAROSCOPY-ASSISTED (N/A)         Significant Diagnostic Studies: Labs:   CBC   Recent Labs   Lab 19  1500   WBC 8.03   HGB 9.8*   HCT 30.9*          Pending Diagnostic Studies:     Procedure Component Value Units Date/Time    Specimen to Pathology - Surgery [[id number]] Collected:  19 0952    Order Status:  Sent Lab Status:  No result     Specimen:  Tissue         Final Active Diagnoses:    Diagnosis Date Noted POA    PRINCIPAL PROBLEM:  Surgery, elective [Z41.9] 2019 Not Applicable      Problems Resolved During this Admission:    assessment and plan.  Discharge home if able to ambulate in the halls and passed flatus today.  Percocet for pain.  Follow-up 2 weeks.    Discharged Condition: good    Disposition: Home or Self Care    Follow Up:  Follow-up Information     Follow up In 2 weeks.               Patient Instructions:      Diet Adult Regular     No driving until:   Order Comments: 14 days for vaginal delivery  28 days for  Section     Notify your health care provider if  you experience any of the following:  temperature >100.4     Notify your health care provider if you experience any of the following:  persistent nausea and vomiting or diarrhea     Notify your health care provider if you experience any of the following:  severe uncontrolled pain     Notify your health care provider if you experience any of the following:  redness, tenderness, or signs of infection (pain, swelling, redness, odor or green/yellow discharge around incision site)     Notify your health care provider if you experience any of the following:  difficulty breathing or increased cough     Notify your health care provider if you experience any of the following:  severe persistent headache     No dressing needed     Leave dressing on - Keep it clean, dry, and intact until clinic visit     Activity as tolerated   Order Comments: Pelvic rest x 6 weeks     Shower on day dressing removed (No bath)     Medications:  Reconciled Home Medications:      Medication List      START taking these medications    ibuprofen 600 MG tablet  Commonly known as:  ADVIL,MOTRIN  Take 1 tablet (600 mg total) by mouth every 6 (six) hours as needed.        CONTINUE taking these medications    ALPRAZolam 0.5 MG tablet  Commonly known as:  XANAX  Take 0.5 mg by mouth 2 (two) times daily.     CANNABIDIOL (CBD) EXTRACT ORAL  Place under the tongue once daily.     fluticasone propionate 50 mcg/actuation nasal spray  Commonly known as:  FLONASE  1 spray by Each Nostril route once daily.     methocarbamol 750 MG Tab  Commonly known as:  ROBAXIN  Take 500 mg by mouth as needed.     multivitamin capsule  Take 1 capsule by mouth once daily.     oxyCODONE-acetaminophen 5-325 mg per tablet  Commonly known as:  PERCOCET  Take 1 tablet by mouth every 4 (four) hours as needed for Pain.        STOP taking these medications    naproxen sodium 220 MG tablet  Commonly known as:  ANAPROX            Mercedes Camacho MD  Obstetrics & Gynecology  [place]  [facility]

## 2019-09-18 NOTE — SUBJECTIVE & OBJECTIVE
Interval History: pt s/p LAVH/BSO.  Pt eating without difficulty, pain well controlled. No flatus yet. VSS, AFP.   over the last 2 hours.      PE   Gen - NAD  Abdomen - binder in place, dry  Ext - scd's bilateral lower extremity    Scheduled Meds:  Continuous Infusions:   lactated ringers 125 mL/hr at 09/17/19 1145     PRN Meds:[START ON 9/18/2019] ibuprofen, meperidine, promethazine (PHENERGAN) IVPB, simethicone    Review of patient's allergies indicates:  No Known Allergies    Objective:     Vital Signs (Most Recent):  Temp: 98.6 °F (37 °C) (09/17/19 1720)  Pulse: 87 (09/17/19 1720)  Resp: 18 (09/17/19 1720)  BP: (!) 101/56 (09/17/19 1720)  SpO2: 99 % (09/17/19 1720) Vital Signs (24h Range):  Temp:  [97.1 °F (36.2 °C)-98.9 °F (37.2 °C)] 98.6 °F (37 °C)  Pulse:  [] 87  Resp:  [12-20] 18  SpO2:  [95 %-100 %] 99 %  BP: ()/(52-67) 101/56     Weight: 68.7 kg (151 lb 7.3 oz)  Body mass index is 24.45 kg/m².  Patient's last menstrual period was 09/10/2019.    I&O (Last 24H):    Intake/Output Summary (Last 24 hours) at 9/17/2019 1929  Last data filed at 9/17/2019 1800  Gross per 24 hour   Intake 2375 ml   Output 1780 ml   Net 595 ml           Laboratory:  CBC:   Recent Labs   Lab 09/17/19  1500   WBC 8.03   RBC 3.34*   HGB 9.8*   HCT 30.9*      MCV 93   MCH 29.3   MCHC 31.7*

## 2019-10-03 ENCOUNTER — HOSPITAL ENCOUNTER (EMERGENCY)
Facility: HOSPITAL | Age: 44
Discharge: HOME | End: 2019-10-03
Attending: EMERGENCY MEDICINE
Payer: COMMERCIAL

## 2019-10-03 VITALS
HEIGHT: 66 IN | OXYGEN SATURATION: 100 % | SYSTOLIC BLOOD PRESSURE: 103 MMHG | DIASTOLIC BLOOD PRESSURE: 58 MMHG | HEART RATE: 91 BPM | RESPIRATION RATE: 108 BRPM | WEIGHT: 145 LBS | BODY MASS INDEX: 23.3 KG/M2 | TEMPERATURE: 98 F

## 2019-10-03 DIAGNOSIS — R10.84 GENERALIZED ABDOMINAL PAIN: ICD-10-CM

## 2019-10-03 DIAGNOSIS — R55 NEAR SYNCOPE: Primary | ICD-10-CM

## 2019-10-03 DIAGNOSIS — N30.01 ACUTE CYSTITIS WITH HEMATURIA: ICD-10-CM

## 2019-10-03 LAB
ALBUMIN SERPL BCP-MCNC: 3.9 G/DL (ref 3.5–5.2)
ALP SERPL-CCNC: 61 U/L (ref 55–135)
ALT SERPL W/O P-5'-P-CCNC: 13 U/L (ref 10–44)
ANION GAP SERPL CALC-SCNC: 9 MMOL/L (ref 8–16)
AST SERPL-CCNC: 16 U/L (ref 10–40)
BACTERIA #/AREA URNS HPF: NEGATIVE /HPF
BASOPHILS # BLD AUTO: 0.02 K/UL (ref 0–0.2)
BASOPHILS NFR BLD: 0.4 % (ref 0–1.9)
BILIRUB SERPL-MCNC: 0.5 MG/DL (ref 0.1–1)
BILIRUB UR QL STRIP: NEGATIVE
BUN SERPL-MCNC: 11 MG/DL (ref 6–20)
CALCIUM SERPL-MCNC: 8.7 MG/DL (ref 8.7–10.5)
CHLORIDE SERPL-SCNC: 101 MMOL/L (ref 95–110)
CLARITY UR: CLEAR
CO2 SERPL-SCNC: 26 MMOL/L (ref 23–29)
COLOR UR: YELLOW
CREAT SERPL-MCNC: 0.7 MG/DL (ref 0.5–1.4)
DIFFERENTIAL METHOD: ABNORMAL
EOSINOPHIL # BLD AUTO: 0 K/UL (ref 0–0.5)
EOSINOPHIL NFR BLD: 0.6 % (ref 0–8)
ERYTHROCYTE [DISTWIDTH] IN BLOOD BY AUTOMATED COUNT: 14.3 % (ref 11.5–14.5)
EST. GFR  (AFRICAN AMERICAN): >60 ML/MIN/1.73 M^2
EST. GFR  (NON AFRICAN AMERICAN): >60 ML/MIN/1.73 M^2
GLUCOSE SERPL-MCNC: 112 MG/DL (ref 70–110)
GLUCOSE UR QL STRIP: NEGATIVE
HCT VFR BLD AUTO: 34.5 % (ref 37–48.5)
HGB BLD-MCNC: 11 G/DL (ref 12–16)
HGB UR QL STRIP: NEGATIVE
HYALINE CASTS #/AREA URNS LPF: 11 /LPF
IMM GRANULOCYTES # BLD AUTO: 0.02 K/UL (ref 0–0.04)
IMM GRANULOCYTES NFR BLD AUTO: 0.4 % (ref 0–0.5)
KETONES UR QL STRIP: NEGATIVE
LEUKOCYTE ESTERASE UR QL STRIP: ABNORMAL
LIPASE SERPL-CCNC: 31 U/L (ref 4–60)
LYMPHOCYTES # BLD AUTO: 1 K/UL (ref 1–4.8)
LYMPHOCYTES NFR BLD: 20.8 % (ref 18–48)
MCH RBC QN AUTO: 28.6 PG (ref 27–31)
MCHC RBC AUTO-ENTMCNC: 31.9 G/DL (ref 32–36)
MCV RBC AUTO: 90 FL (ref 82–98)
MICROSCOPIC COMMENT: ABNORMAL
MONOCYTES # BLD AUTO: 0.2 K/UL (ref 0.3–1)
MONOCYTES NFR BLD: 4.2 % (ref 4–15)
NEUTROPHILS # BLD AUTO: 3.5 K/UL (ref 1.8–7.7)
NEUTROPHILS NFR BLD: 73.6 % (ref 38–73)
NITRITE UR QL STRIP: NEGATIVE
NRBC BLD-RTO: 0 /100 WBC
PH UR STRIP: 7 [PH] (ref 5–8)
PLATELET # BLD AUTO: 322 K/UL (ref 150–350)
PMV BLD AUTO: 9.6 FL (ref 9.2–12.9)
POTASSIUM SERPL-SCNC: 3.9 MMOL/L (ref 3.5–5.1)
PROT SERPL-MCNC: 7.1 G/DL (ref 6–8.4)
PROT UR QL STRIP: ABNORMAL
RBC # BLD AUTO: 3.84 M/UL (ref 4–5.4)
RBC #/AREA URNS HPF: 10 /HPF (ref 0–4)
SODIUM SERPL-SCNC: 136 MMOL/L (ref 136–145)
SP GR UR STRIP: 1.02 (ref 1–1.03)
SQUAMOUS #/AREA URNS HPF: 6 /HPF
URN SPEC COLLECT METH UR: ABNORMAL
UROBILINOGEN UR STRIP-ACNC: ABNORMAL EU/DL
WBC # BLD AUTO: 4.76 K/UL (ref 3.9–12.7)
WBC #/AREA URNS HPF: 2 /HPF (ref 0–5)

## 2019-10-03 PROCEDURE — 63600175 PHARM REV CODE 636 W HCPCS: Performed by: EMERGENCY MEDICINE

## 2019-10-03 PROCEDURE — 85025 COMPLETE CBC W/AUTO DIFF WBC: CPT

## 2019-10-03 PROCEDURE — 81001 URINALYSIS AUTO W/SCOPE: CPT

## 2019-10-03 PROCEDURE — 99284 EMERGENCY DEPT VISIT MOD MDM: CPT | Mod: 25

## 2019-10-03 PROCEDURE — 96361 HYDRATE IV INFUSION ADD-ON: CPT

## 2019-10-03 PROCEDURE — 80053 COMPREHEN METABOLIC PANEL: CPT

## 2019-10-03 PROCEDURE — 83690 ASSAY OF LIPASE: CPT

## 2019-10-03 PROCEDURE — 96374 THER/PROPH/DIAG INJ IV PUSH: CPT

## 2019-10-03 RX ORDER — FLUCONAZOLE 150 MG/1
150 TABLET ORAL DAILY
Qty: 3 TABLET | Refills: 0 | Status: SHIPPED | OUTPATIENT
Start: 2019-10-03 | End: 2019-10-06

## 2019-10-03 RX ORDER — NITROFURANTOIN 25; 75 MG/1; MG/1
100 CAPSULE ORAL 2 TIMES DAILY
Qty: 14 CAPSULE | Refills: 0 | Status: SHIPPED | OUTPATIENT
Start: 2019-10-03 | End: 2019-10-10

## 2019-10-03 RX ADMIN — SODIUM CHLORIDE, SODIUM LACTATE, POTASSIUM CHLORIDE, AND CALCIUM CHLORIDE 1000 ML: .6; .31; .03; .02 INJECTION, SOLUTION INTRAVENOUS at 04:10

## 2019-10-03 RX ADMIN — CEFTRIAXONE 1 G: 1 INJECTION, SOLUTION INTRAVENOUS at 04:10

## 2019-10-03 NOTE — ED NOTES
Orthostatic Vital Signs for Becky Moyerolph:       BP  Pulse    Lyin/57   90  Sitting   101/57  92  Standing  105/59  103    Pulse Ox:  100%    Respirations:  19

## 2019-10-03 NOTE — ED NOTES
Pt states she had a hysterectomy. She went to the doctor on Monday because she was having severe pain. The doctor put her on some antibiotics, but the pain is not getting better and she has been passing out. She had an an appt to see her doctor today but she had an emergency at the hospital today and was not able to see her so they told her she needed to come to the ER to get checked out. Pts incisions look like they are healing nicely. She is tender to the touch. She is currently in the room crying because she thinks she is going to be septic and die.

## 2019-10-03 NOTE — ED PROVIDER NOTES
Encounter Date: 10/3/2019       History     Chief Complaint   Patient presents with    Pre Syncope     2nd episode while in shower, abd pain, nausea, hysterectomy 2 weeks ago     43-year-old female status post hysterectomy approximately 2 weeks ago performed by Dr. Turpin.  Patient presents to the emergency department with complaint of near syncopal event x2 today.  Patient states that she has been experiencing dysuria lower abdominal pain over the last 1 and half weeks.  The patient does state that her abdominal pain has improved.  However she still has sensations of mild dysuria.  She denies vaginal bleeding, no vaginal discharge, no fever, no flank pain, no chest pain, no shortness of breath, no headache, no other constitutional symptoms. Patient states was seen by Dr. Turpin in prescribed clindamycin earlier in the week secondary to mild vaginal discharge which which has improved.        Review of patient's allergies indicates:  No Known Allergies  Past Medical History:   Diagnosis Date    Anxiety     Anxiety 1994    on meds    Arthritis 1990    knees miley, miley thumbs    Depression     Fibroids 09/03/2019    Dr Turpin    Menorrhagia 09/03/2019    Dr Turpin    V-tach 2007    resolved itself     Past Surgical History:   Procedure Laterality Date    ARTHROSCOPIC CHONDROPLASTY OF KNEE JOINT Left 1997    BACK SURGERY  2007    jerrell    BREAST BIOPSY Left 1996    ECHOCARDIOGRAPHY  2007    laparascopic  1998    diagnostic to remove endometriosis    LAPAROSCOPY-ASSISTED VAGINAL HYSTERECTOMY N/A 9/17/2019    Procedure: HYSTERECTOMY, VAGINAL, LAPAROSCOPY-ASSISTED;  Surgeon: Isabella Turpin MD;  Location: [facility];  Service: OB/GYN;  Laterality: N/A;    stess test  2007     No family history on file.  Social History     Tobacco Use    Smoking status: Current Every Day Smoker     Packs/day: 0.50     Years: 20.00     Pack years: 10.00     Types: Cigarettes    Smokeless tobacco: Never Used   Substance Use Topics     Alcohol use: No    Drug use: No     Review of Systems   Constitutional: Negative for chills, fatigue and fever.   HENT: Negative for congestion, postnasal drip, rhinorrhea, sinus pain and trouble swallowing.    Eyes: Negative for photophobia and visual disturbance.   Respiratory: Negative for chest tightness, shortness of breath and wheezing.    Cardiovascular: Negative for chest pain, palpitations and leg swelling.   Gastrointestinal: Negative for abdominal pain, blood in stool, nausea and vomiting.   Endocrine: Negative for polydipsia, polyphagia and polyuria.   Genitourinary: Positive for dysuria and frequency. Negative for flank pain, urgency, vaginal bleeding, vaginal discharge and vaginal pain.   Musculoskeletal: Negative for back pain, gait problem and myalgias.   Skin: Negative for rash.   Neurological: Negative for dizziness, tremors, weakness, light-headedness and numbness.   Hematological: Does not bruise/bleed easily.   Psychiatric/Behavioral: Negative for confusion.   All other systems reviewed and are negative.      Physical Exam     Initial Vitals [10/03/19 1156]   BP Pulse Resp Temp SpO2   (!) 104/55 84 (!) 108 98.5 °F (36.9 °C) 100 %      MAP       --         Physical Exam    Nursing note and vitals reviewed.  Constitutional: She appears well-developed and well-nourished.   HENT:   Head: Normocephalic and atraumatic.   Nose: Nose normal.   Mouth/Throat: Oropharynx is clear and moist.   Eyes: Conjunctivae and EOM are normal. Pupils are equal, round, and reactive to light.   Neck: Normal range of motion. Neck supple. No thyromegaly present. No tracheal deviation present.   Cardiovascular: Normal rate, regular rhythm, normal heart sounds and intact distal pulses. Exam reveals no gallop and no friction rub.    No murmur heard.  Pulmonary/Chest: Breath sounds normal. No stridor. No respiratory distress.   Abdominal: Soft. Bowel sounds are normal. She exhibits no distension and no mass. There is no  rebound and no guarding.   Patient with mild suprapubic tenderness. No rebound, no guarding.   Genitourinary: Vagina normal.   Musculoskeletal: Normal range of motion. She exhibits no edema.   Lymphadenopathy:     She has no cervical adenopathy.   Neurological: She is alert and oriented to person, place, and time. She has normal strength and normal reflexes. GCS score is 15. GCS eye subscore is 4. GCS verbal subscore is 5. GCS motor subscore is 6.   Skin: Skin is warm and dry. Capillary refill takes less than 2 seconds.   Psychiatric: She has a normal mood and affect.         ED Course   Procedures  Labs Reviewed   CBC W/ AUTO DIFFERENTIAL - Abnormal; Notable for the following components:       Result Value    RBC 3.84 (*)     Hemoglobin 11.0 (*)     Hematocrit 34.5 (*)     Mean Corpuscular Hemoglobin Conc 31.9 (*)     Mono # 0.2 (*)     Gran% 73.6 (*)     All other components within normal limits    Narrative:     For upper or mid abdominal pain.   COMPREHENSIVE METABOLIC PANEL - Abnormal; Notable for the following components:    Glucose 112 (*)     All other components within normal limits    Narrative:     For upper or mid abdominal pain.   URINALYSIS - Abnormal; Notable for the following components:    Protein, UA Trace (*)     Urobilinogen, UA 2.0-3.0 (*)     Leukocytes, UA 1+ (*)     All other components within normal limits    Narrative:     In and Out Cath as needed it patient unable to void   URINALYSIS MICROSCOPIC - Abnormal; Notable for the following components:    RBC, UA 10 (*)     Hyaline Casts, UA 11 (*)     All other components within normal limits    Narrative:     In and Out Cath as needed it patient unable to void   CULTURE, URINE   LIPASE    Narrative:     For upper or mid abdominal pain.          Imaging Results    None          Medical Decision Making:   Initial Assessment:   43-year-old female status post hysterectomy laparoscopic via Dr. Turpin approximately 2 weeks ago presents with complaint  of dysuria near syncopal episode while standing in shower earlier today.  Differential Diagnosis:   Volume depletion, dehydration, symptomatic anemia, orthostatic hypotension, urinary tract infection, bacteremia  Clinical Tests:   Lab Tests: Ordered and Reviewed  Medical Tests: Ordered and Reviewed                   ED Course as of Oct 03 1724   Thu Oct 03, 2019   1721 EKG, normal sinus rhythm, 66, no ischemic changes noted. Patient's labs were reviewed patient found to have H&H of 11 and 34.  White count 4.7.  Urinalysis revealed to be +1 leukocyte esterase positive.    [RM]   1722 Patient emergency department was treated for suspected orthostatic hypotension.  Patient received IV hydration 1 L LR.  Also was given Rocephin 1 g IV piggyback for urinary tract infection.  Patient will be discharged with Macrobid 100 mg b.i.d. for next 7 days.  She is instructed to continue on the clindamycin as well as previously prescribed.  She is to follow up with Dr. Turpin as scheduled next week.  She is to return to the emergency department if problems persist or worsen including fever, worsening abdominal pain or worsening symptomatology.   Microscopic Comment: SEE COMMENT [RM]      ED Course User Index  [RM] Enrique Lewis MD     Clinical Impression:       ICD-10-CM ICD-9-CM   1. Near syncope R55 780.2   2. Acute cystitis with hematuria N30.01 595.0   3. Generalized abdominal pain R10.84 789.07                                Enrique Lewis MD  10/03/19 1731       Enrique Lewis MD  10/03/19 1733

## 2019-10-15 DIAGNOSIS — F41.9 ANXIETY: Primary | ICD-10-CM

## 2019-10-15 RX ORDER — ALPRAZOLAM 0.5 MG/1
0.5 TABLET ORAL 2 TIMES DAILY
Qty: 180 TABLET | Refills: 0 | Status: SHIPPED | OUTPATIENT
Start: 2019-10-15 | End: 2020-01-22 | Stop reason: SDUPTHER

## 2019-10-15 NOTE — TELEPHONE ENCOUNTER
----- Message from Shanda Munoz sent at 10/15/2019  2:57 PM CDT -----  Pt needs a refill for Xanax .5 mg. [facility] on [address]. 90 day supply

## 2019-11-13 ENCOUNTER — OFFICE VISIT (OUTPATIENT)
Dept: FAMILY MEDICINE | Facility: CLINIC | Age: 44
End: 2019-11-13
Payer: COMMERCIAL

## 2019-11-13 VITALS
WEIGHT: 146.81 LBS | HEART RATE: 80 BPM | SYSTOLIC BLOOD PRESSURE: 102 MMHG | HEIGHT: 66 IN | DIASTOLIC BLOOD PRESSURE: 66 MMHG | BODY MASS INDEX: 23.59 KG/M2

## 2019-11-13 DIAGNOSIS — D50.0 IRON DEFICIENCY ANEMIA DUE TO CHRONIC BLOOD LOSS: ICD-10-CM

## 2019-11-13 DIAGNOSIS — F17.200 SMOKER: ICD-10-CM

## 2019-11-13 DIAGNOSIS — F41.1 GAD (GENERALIZED ANXIETY DISORDER): ICD-10-CM

## 2019-11-13 DIAGNOSIS — J30.9 ALLERGIC RHINITIS, UNSPECIFIED SEASONALITY, UNSPECIFIED TRIGGER: Primary | ICD-10-CM

## 2019-11-13 DIAGNOSIS — R73.01 IFG (IMPAIRED FASTING GLUCOSE): ICD-10-CM

## 2019-11-13 DIAGNOSIS — Z13.6 SCREENING FOR ISCHEMIC HEART DISEASE (IHD): ICD-10-CM

## 2019-11-13 DIAGNOSIS — Z79.899 LONG-TERM USE OF HIGH-RISK MEDICATION: ICD-10-CM

## 2019-11-13 PROCEDURE — 99204 PR OFFICE/OUTPT VISIT, NEW, LEVL IV, 45-59 MIN: ICD-10-PCS | Mod: S$GLB,,, | Performed by: FAMILY MEDICINE

## 2019-11-13 PROCEDURE — 99204 OFFICE O/P NEW MOD 45 MIN: CPT | Mod: S$GLB,,, | Performed by: FAMILY MEDICINE

## 2019-11-13 PROCEDURE — 3008F PR BODY MASS INDEX (BMI) DOCUMENTED: ICD-10-PCS | Mod: S$GLB,,, | Performed by: FAMILY MEDICINE

## 2019-11-13 PROCEDURE — 3008F BODY MASS INDEX DOCD: CPT | Mod: S$GLB,,, | Performed by: FAMILY MEDICINE

## 2019-11-13 NOTE — PROGRESS NOTES
"  SUBJECTIVE:    Patient ID: Becky Sy is a 43 y.o. female.    Chief Complaint: Follow-up (CORRECTION:  4 month follow up from HYSTERECTOMY "not" )    Pt here to checkup on chronic conditions.      Allergies are doing well.    Pt had hysterectomy about 8 weeks ago for severe menses.  Had low blood pressure after surgery.  States they were on the verge of giving her blood after the surgery.  Has had some lightheadedness since the surgery.  Hasn't done anything for the last 7 weeks. Been drinking a lot of water. Eating a balanced diet.  Has mild anemia    Anxiety is doing ok, on 250mg luvox daily, and xanax daily.    Drinking 1 cup of coffee daily.  Continues to smoke regularly.  Ready to quit, has tried to stop in the past without aid, can for a few days but goes right back.    Lower back is almost nonexistent since taking cbd oil since august.  Knees on the other hand are bothering her some.    Pt c/o pains in both hands and/or feet, that come and go and last for about 30 minutes, that is associated with bulging veins.  Pain is usually about 4/10.        Admission on 10/03/2019, Discharged on 10/03/2019   Component Date Value Ref Range Status    WBC 10/03/2019 4.76  3.90 - 12.70 K/uL Final    RBC 10/03/2019 3.84* 4.00 - 5.40 M/uL Final    Hemoglobin 10/03/2019 11.0* 12.0 - 16.0 g/dL Final    Hematocrit 10/03/2019 34.5* 37.0 - 48.5 % Final    Mean Corpuscular Volume 10/03/2019 90  82 - 98 fL Final    Mean Corpuscular Hemoglobin 10/03/2019 28.6  27.0 - 31.0 pg Final    Mean Corpuscular Hemoglobin Conc 10/03/2019 31.9* 32.0 - 36.0 g/dL Final    RDW 10/03/2019 14.3  11.5 - 14.5 % Final    Platelets 10/03/2019 322  150 - 350 K/uL Final    MPV 10/03/2019 9.6  9.2 - 12.9 fL Final    Immature Granulocytes 10/03/2019 0.4  0.0 - 0.5 % Final    Gran # (ANC) 10/03/2019 3.5  1.8 - 7.7 K/uL Final    Immature Grans (Abs) 10/03/2019 0.02  0.00 - 0.04 K/uL Final    Lymph # 10/03/2019 1.0  1.0 - 4.8 " K/uL Final    Mono # 10/03/2019 0.2* 0.3 - 1.0 K/uL Final    Eos # 10/03/2019 0.0  0.0 - 0.5 K/uL Final    Baso # 10/03/2019 0.02  0.00 - 0.20 K/uL Final    nRBC 10/03/2019 0  0 /100 WBC Final    Gran% 10/03/2019 73.6* 38.0 - 73.0 % Final    Lymph% 10/03/2019 20.8  18.0 - 48.0 % Final    Mono% 10/03/2019 4.2  4.0 - 15.0 % Final    Eosinophil% 10/03/2019 0.6  0.0 - 8.0 % Final    Basophil% 10/03/2019 0.4  0.0 - 1.9 % Final    Differential Method 10/03/2019 Automated   Final    Sodium 10/03/2019 136  136 - 145 mmol/L Final    Potassium 10/03/2019 3.9  3.5 - 5.1 mmol/L Final    Chloride 10/03/2019 101  95 - 110 mmol/L Final    CO2 10/03/2019 26  23 - 29 mmol/L Final    Glucose 10/03/2019 112* 70 - 110 mg/dL Final    BUN, Bld 10/03/2019 11  6 - 20 mg/dL Final    Creatinine 10/03/2019 0.7  0.5 - 1.4 mg/dL Final    Calcium 10/03/2019 8.7  8.7 - 10.5 mg/dL Final    Total Protein 10/03/2019 7.1  6.0 - 8.4 g/dL Final    Albumin 10/03/2019 3.9  3.5 - 5.2 g/dL Final    Total Bilirubin 10/03/2019 0.5  0.1 - 1.0 mg/dL Final    Alkaline Phosphatase 10/03/2019 61  55 - 135 U/L Final    AST 10/03/2019 16  10 - 40 U/L Final    ALT 10/03/2019 13  10 - 44 U/L Final    Anion Gap 10/03/2019 9  8 - 16 mmol/L Final    eGFR if African American 10/03/2019 >60.0  >60 mL/min/1.73 m^2 Final    eGFR if non African American 10/03/2019 >60.0  >60 mL/min/1.73 m^2 Final    Lipase 10/03/2019 31  4 - 60 U/L Final    Specimen UA 10/03/2019 Urine, Clean Catch   Final    Color, UA 10/03/2019 Yellow  Yellow, Straw, Mariela Final    Appearance, UA 10/03/2019 Clear  Clear Final    pH, UA 10/03/2019 7.0  5.0 - 8.0 Final    Specific Gravity, UA 10/03/2019 1.025  1.005 - 1.030 Final    Protein, UA 10/03/2019 Trace* Negative Final    Glucose, UA 10/03/2019 Negative  Negative Final    Ketones, UA 10/03/2019 Negative  Negative Final    Bilirubin (UA) 10/03/2019 Negative  Negative Final    Occult Blood UA 10/03/2019 Negative   Negative Final    Nitrite, UA 10/03/2019 Negative  Negative Final    Urobilinogen, UA 10/03/2019 2.0-3.0* Negative EU/dL Final    Leukocytes, UA 10/03/2019 1+* Negative Final    RBC, UA 10/03/2019 10* 0 - 4 /hpf Final    WBC, UA 10/03/2019 2  0 - 5 /hpf Final    Bacteria 10/03/2019 Negative  None-Occ /hpf Final    Squam Epithel, UA 10/03/2019 6  /hpf Final    Hyaline Casts, UA 10/03/2019 11* 0-1/lpf /lpf Final    Microscopic Comment 10/03/2019 SEE COMMENT   Final   Admission on 09/17/2019, Discharged on 09/18/2019   Component Date Value Ref Range Status    Preg, Serum 09/17/2019 Negative   Final    POC Preg Test, Ur 09/17/2019 Negative  Negative Final     Acceptable 09/17/2019 Yes   Final    WBC 09/17/2019 8.03  3.90 - 12.70 K/uL Final    RBC 09/17/2019 3.34* 4.00 - 5.40 M/uL Final    Hemoglobin 09/17/2019 9.8* 12.0 - 16.0 g/dL Final    Hematocrit 09/17/2019 30.9* 37.0 - 48.5 % Final    Mean Corpuscular Volume 09/17/2019 93  82 - 98 fL Final    Mean Corpuscular Hemoglobin 09/17/2019 29.3  27.0 - 31.0 pg Final    Mean Corpuscular Hemoglobin Conc 09/17/2019 31.7* 32.0 - 36.0 g/dL Final    RDW 09/17/2019 14.9* 11.5 - 14.5 % Final    Platelets 09/17/2019 256  150 - 350 K/uL Final    MPV 09/17/2019 10.3  9.2 - 12.9 fL Final    Immature Granulocytes 09/17/2019 0.2  0.0 - 0.5 % Final    Gran # (ANC) 09/17/2019 7.4  1.8 - 7.7 K/uL Final    Immature Grans (Abs) 09/17/2019 0.02  0.00 - 0.04 K/uL Final    Lymph # 09/17/2019 0.5* 1.0 - 4.8 K/uL Final    Mono # 09/17/2019 0.1* 0.3 - 1.0 K/uL Final    Eos # 09/17/2019 0.0  0.0 - 0.5 K/uL Final    Baso # 09/17/2019 0.01  0.00 - 0.20 K/uL Final    nRBC 09/17/2019 0  0 /100 WBC Final    Gran% 09/17/2019 92.5* 38.0 - 73.0 % Final    Lymph% 09/17/2019 6.1* 18.0 - 48.0 % Final    Mono% 09/17/2019 1.1* 4.0 - 15.0 % Final    Eosinophil% 09/17/2019 0.0  0.0 - 8.0 % Final    Basophil% 09/17/2019 0.1  0.0 - 1.9 % Final    Differential  Method 09/17/2019 Automated   Final   Lab Visit on 09/13/2019   Component Date Value Ref Range Status    Group & Rh 09/13/2019 O POS   Final    Indirect Andrés 09/13/2019 NEG   Final   Lab Visit on 09/03/2019   Component Date Value Ref Range Status    WBC 09/03/2019 5.18  3.90 - 12.70 K/uL Final    RBC 09/03/2019 4.02  4.00 - 5.40 M/uL Final    Hemoglobin 09/03/2019 11.8* 12.0 - 16.0 g/dL Final    Hematocrit 09/03/2019 37.0  37.0 - 48.5 % Final    Mean Corpuscular Volume 09/03/2019 92  82 - 98 fL Final    Mean Corpuscular Hemoglobin 09/03/2019 29.4  27.0 - 31.0 pg Final    Mean Corpuscular Hemoglobin Conc 09/03/2019 31.9* 32.0 - 36.0 g/dL Final    RDW 09/03/2019 14.8* 11.5 - 14.5 % Final    Platelets 09/03/2019 247  150 - 350 K/uL Final    MPV 09/03/2019 10.1  9.2 - 12.9 fL Final    Preg, Serum 09/03/2019 Negative   Final       Past Medical History:   Diagnosis Date    Allergy     Anxiety     Anxiety 1994    on meds    Arthritis 1990    knees miley, miley thumbs    Depression     Fibroids 09/03/2019    Dr Turpin    Menorrhagia 09/03/2019    Dr Turpin    V-tach 2007    resolved itself     Past Surgical History:   Procedure Laterality Date    ARTHROSCOPIC CHONDROPLASTY OF KNEE JOINT Left 1997    BACK SURGERY  2007    jerrell    BREAST BIOPSY Left 1996    ECHOCARDIOGRAPHY  2007    HYSTERECTOMY      laparascopic  1998    diagnostic to remove endometriosis    LAPAROSCOPY-ASSISTED VAGINAL HYSTERECTOMY N/A 9/17/2019    Procedure: HYSTERECTOMY, VAGINAL, LAPAROSCOPY-ASSISTED;  Surgeon: Isabella Turpin MD;  Location: [facility];  Service: OB/GYN;  Laterality: N/A;    stess test  2007     History reviewed. No pertinent family history.    Marital Status: Legally   Alcohol History:  reports that she drinks alcohol.  Tobacco History:  reports that she has been smoking cigarettes. She has a 10.00 pack-year smoking history. She has never used smokeless tobacco.  Drug History:  reports that she does not use  "drugs.    Review of patient's allergies indicates:  No Known Allergies    Current Outpatient Medications:     ALPRAZolam (XANAX) 0.5 MG tablet, Take 1 tablet (0.5 mg total) by mouth 2 (two) times daily., Disp: 180 tablet, Rfl: 0    CANNABIDIOL, CBD, EXTRACT ORAL, Place under the tongue once daily., Disp: , Rfl:     fluticasone propionate (FLONASE) 50 mcg/actuation nasal spray, 1 spray by Each Nostril route once daily., Disp: , Rfl:     ibuprofen (ADVIL,MOTRIN) 600 MG tablet, Take 1 tablet (600 mg total) by mouth every 6 (six) hours as needed., Disp: , Rfl: 0    multivitamin capsule, Take 1 capsule by mouth once daily., Disp: , Rfl:     methocarbamol (ROBAXIN) 750 MG Tab, Take 500 mg by mouth as needed., Disp: , Rfl:     oxyCODONE-acetaminophen (PERCOCET) 5-325 mg per tablet, Take 1 tablet by mouth every 4 (four) hours as needed for Pain., Disp: , Rfl:     Review of Systems   Constitutional: Negative for appetite change, fatigue, fever and unexpected weight change.   Respiratory: Negative for cough, chest tightness, shortness of breath and wheezing.    Cardiovascular: Negative for chest pain and leg swelling.   Gastrointestinal: Negative for abdominal pain, constipation, nausea and vomiting.        -heartburn   Genitourinary: Negative for difficulty urinating, dysuria, frequency and urgency.   Musculoskeletal: Negative for arthralgias, back pain, myalgias and neck pain.   Skin: Negative for rash.   Allergic/Immunologic: Positive for environmental allergies.   Neurological: Negative for dizziness, weakness, numbness and headaches.   Hematological: Does not bruise/bleed easily.   Psychiatric/Behavioral: Negative for dysphoric mood, sleep disturbance and suicidal ideas. The patient is nervous/anxious.    All other systems reviewed and are negative.         Objective:      Vitals:    11/13/19 1210   BP: 102/66   Pulse: 80   Weight: 66.6 kg (146 lb 12.8 oz)   Height: 5' 6" (1.676 m)     Body mass index is 23.69 " kg/m².  Physical Exam   Constitutional: She is oriented to person, place, and time. She appears well-developed and well-nourished. No distress.   thin   HENT:   Head: Normocephalic and atraumatic.   Neck: Neck supple. No thyromegaly present.   Cardiovascular: Normal rate, regular rhythm and normal heart sounds. Exam reveals no friction rub.   No murmur heard.  Pulmonary/Chest: Effort normal and breath sounds normal. She has no wheezes. She has no rales.   Abdominal: Soft. Bowel sounds are normal. She exhibits no distension. There is no tenderness.   Musculoskeletal: She exhibits no edema.   Lymphadenopathy:     She has no cervical adenopathy.   Neurological: She is alert and oriented to person, place, and time.   Skin: Skin is warm and dry. No rash noted.   Psychiatric: She has a normal mood and affect. Her speech is normal and behavior is normal. Judgment and thought content normal. She is attentive.   Vitals reviewed.        Assessment:       1. Allergic rhinitis, unspecified seasonality, unspecified trigger    2. DELL (generalized anxiety disorder)    3. Iron deficiency anemia due to chronic blood loss    4. IFG (impaired fasting glucose)    5. Smoker    6. Screening for ischemic heart disease (IHD)    7. Long-term use of high-risk medication         Plan:       Allergic rhinitis, unspecified seasonality, unspecified trigger  Comments:  Controlled.  Will continue to monitor.    DELL (generalized anxiety disorder)  Comments:  Stable. Pt to try to get a counselor when financially able.    Iron deficiency anemia due to chronic blood loss  Comments:  New problem. Expect complete resolution s/p hysterectomy. will follow symptoms.  Orders:  -     CBC auto differential; Future; Expected date: 01/17/2020    IFG (impaired fasting glucose)  Comments:  Labs ordered for future monitoring to evaluate.  Orders:  -     Lipid panel; Future; Expected date: 01/17/2020  -     Comprehensive metabolic panel; Future; Expected date:  01/17/2020  -     Hemoglobin A1c; Future; Expected date: 01/17/2020    Smoker  Comments:  Pt strongly encouraged to stop smoking.    Screening for ischemic heart disease (IHD)  -     Lipid panel; Future; Expected date: 01/17/2020    Long-term use of high-risk medication  -     Comprehensive metabolic panel; Future; Expected date: 01/17/2020  -     CBC auto differential; Future; Expected date: 01/17/2020    Other  Lab results discussed and reviewed with patient.  Labs have been ordered for monitoring of chronic conditions, just before next visit.    Follow up in about 4 months (around 3/13/2020) for Anxiety, Allergies, IFG.

## 2020-01-03 ENCOUNTER — PATIENT MESSAGE (OUTPATIENT)
Dept: FAMILY MEDICINE | Facility: CLINIC | Age: 45
End: 2020-01-03

## 2020-01-03 NOTE — TELEPHONE ENCOUNTER
Pt complaining of itchy painful ears; requesting to be seen today or should she go to urgent care.

## 2020-01-22 ENCOUNTER — PATIENT MESSAGE (OUTPATIENT)
Dept: FAMILY MEDICINE | Facility: CLINIC | Age: 45
End: 2020-01-22

## 2020-01-22 DIAGNOSIS — F41.9 ANXIETY: ICD-10-CM

## 2020-01-22 RX ORDER — ALPRAZOLAM 0.5 MG/1
0.5 TABLET ORAL 2 TIMES DAILY
Qty: 180 TABLET | Refills: 0 | Status: SHIPPED | OUTPATIENT
Start: 2020-01-22 | End: 2020-04-02 | Stop reason: SDUPTHER

## 2020-03-03 ENCOUNTER — HOSPITAL ENCOUNTER (OUTPATIENT)
Dept: RADIOLOGY | Facility: HOSPITAL | Age: 45
Discharge: HOME | End: 2020-03-03
Attending: FAMILY MEDICINE
Payer: COMMERCIAL

## 2020-03-03 ENCOUNTER — PATIENT MESSAGE (OUTPATIENT)
Dept: FAMILY MEDICINE | Facility: CLINIC | Age: 45
End: 2020-03-03

## 2020-03-03 VITALS — WEIGHT: 146.81 LBS | HEIGHT: 66 IN | BODY MASS INDEX: 23.59 KG/M2

## 2020-03-03 DIAGNOSIS — R92.8 ABNORMAL MAMMOGRAM: ICD-10-CM

## 2020-03-03 PROCEDURE — 77066 DX MAMMO INCL CAD BI: CPT | Mod: TC,PO

## 2020-03-05 ENCOUNTER — TELEPHONE (OUTPATIENT)
Dept: FAMILY MEDICINE | Facility: CLINIC | Age: 45
End: 2020-03-05

## 2020-03-05 LAB
ALBUMIN SERPL-MCNC: 4.2 G/DL (ref 3.6–5.1)
ALBUMIN/GLOB SERPL: 2 (CALC) (ref 1–2.5)
ALP SERPL-CCNC: 55 U/L (ref 31–125)
ALT SERPL-CCNC: 14 U/L (ref 6–29)
AST SERPL-CCNC: 15 U/L (ref 10–30)
BASOPHILS # BLD AUTO: 9 CELLS/UL (ref 0–200)
BASOPHILS NFR BLD AUTO: 0.2 %
BILIRUB SERPL-MCNC: 0.5 MG/DL (ref 0.2–1.2)
BUN SERPL-MCNC: 10 MG/DL (ref 7–25)
BUN/CREAT SERPL: NORMAL (CALC) (ref 6–22)
CALCIUM SERPL-MCNC: 9.2 MG/DL (ref 8.6–10.2)
CHLORIDE SERPL-SCNC: 109 MMOL/L (ref 98–110)
CHOLEST SERPL-MCNC: 194 MG/DL
CHOLEST/HDLC SERPL: 2.9 (CALC)
CO2 SERPL-SCNC: 28 MMOL/L (ref 20–32)
CREAT SERPL-MCNC: 0.71 MG/DL (ref 0.5–1.1)
EOSINOPHIL # BLD AUTO: 30 CELLS/UL (ref 15–500)
EOSINOPHIL NFR BLD AUTO: 0.7 %
ERYTHROCYTE [DISTWIDTH] IN BLOOD BY AUTOMATED COUNT: 13.7 % (ref 11–15)
GFRSERPLBLD MDRD-ARVRAT: 104 ML/MIN/1.73M2
GLOBULIN SER CALC-MCNC: 2.1 G/DL (CALC) (ref 1.9–3.7)
GLUCOSE SERPL-MCNC: 91 MG/DL (ref 65–99)
HBA1C MFR BLD: 5.3 % OF TOTAL HGB
HCT VFR BLD AUTO: 39.3 % (ref 35–45)
HDLC SERPL-MCNC: 67 MG/DL
HGB BLD-MCNC: 13.4 G/DL (ref 11.7–15.5)
LDLC SERPL CALC-MCNC: 111 MG/DL (CALC)
LYMPHOCYTES # BLD AUTO: 1376 CELLS/UL (ref 850–3900)
LYMPHOCYTES NFR BLD AUTO: 32 %
MCH RBC QN AUTO: 30.9 PG (ref 27–33)
MCHC RBC AUTO-ENTMCNC: 34.1 G/DL (ref 32–36)
MCV RBC AUTO: 90.6 FL (ref 80–100)
MONOCYTES # BLD AUTO: 211 CELLS/UL (ref 200–950)
MONOCYTES NFR BLD AUTO: 4.9 %
NEUTROPHILS # BLD AUTO: 2675 CELLS/UL (ref 1500–7800)
NEUTROPHILS NFR BLD AUTO: 62.2 %
NONHDLC SERPL-MCNC: 127 MG/DL (CALC)
PLATELET # BLD AUTO: 279 THOUSAND/UL (ref 140–400)
PMV BLD REES-ECKER: 9.9 FL (ref 7.5–12.5)
POTASSIUM SERPL-SCNC: 4.4 MMOL/L (ref 3.5–5.3)
PROT SERPL-MCNC: 6.3 G/DL (ref 6.1–8.1)
RBC # BLD AUTO: 4.34 MILLION/UL (ref 3.8–5.1)
SODIUM SERPL-SCNC: 142 MMOL/L (ref 135–146)
TRIGL SERPL-MCNC: 70 MG/DL
WBC # BLD AUTO: 4.3 THOUSAND/UL (ref 3.8–10.8)

## 2020-03-05 NOTE — TELEPHONE ENCOUNTER
Spoke with pt - Pt informed of her abnormal results and recommendations per Dr. Aguirre verbatim. Pt verbalized an understanding of our conversation. FRANCESCO

## 2020-03-05 NOTE — TELEPHONE ENCOUNTER
----- Message from Pili Aguirre MD sent at 3/4/2020  2:12 PM CST -----  Please call the patient regarding her abnormal mammgoram.    1. Has microcalcification in the left lower outer breast that remain stable, from prior exams.   1. Needs follow up LEFT breast DIAGnostic mammogram in 6 months.

## 2020-03-10 ENCOUNTER — OFFICE VISIT (OUTPATIENT)
Dept: FAMILY MEDICINE | Facility: CLINIC | Age: 45
End: 2020-03-10
Payer: COMMERCIAL

## 2020-03-10 VITALS
SYSTOLIC BLOOD PRESSURE: 108 MMHG | BODY MASS INDEX: 25.1 KG/M2 | DIASTOLIC BLOOD PRESSURE: 72 MMHG | WEIGHT: 156.19 LBS | HEART RATE: 102 BPM | HEIGHT: 66 IN | OXYGEN SATURATION: 98 %

## 2020-03-10 DIAGNOSIS — J30.9 ALLERGIC RHINITIS, UNSPECIFIED SEASONALITY, UNSPECIFIED TRIGGER: ICD-10-CM

## 2020-03-10 DIAGNOSIS — F17.200 SMOKER: ICD-10-CM

## 2020-03-10 DIAGNOSIS — G89.29 CHRONIC RIGHT-SIDED LOW BACK PAIN WITHOUT SCIATICA: ICD-10-CM

## 2020-03-10 DIAGNOSIS — F41.1 GAD (GENERALIZED ANXIETY DISORDER): Primary | ICD-10-CM

## 2020-03-10 DIAGNOSIS — R39.89 BLADDER PAIN: ICD-10-CM

## 2020-03-10 DIAGNOSIS — M54.50 CHRONIC RIGHT-SIDED LOW BACK PAIN WITHOUT SCIATICA: ICD-10-CM

## 2020-03-10 PROCEDURE — 3008F BODY MASS INDEX DOCD: CPT | Mod: S$GLB,,, | Performed by: FAMILY MEDICINE

## 2020-03-10 PROCEDURE — 99214 PR OFFICE/OUTPT VISIT, EST, LEVL IV, 30-39 MIN: ICD-10-PCS | Mod: S$GLB,,, | Performed by: FAMILY MEDICINE

## 2020-03-10 PROCEDURE — 99214 OFFICE O/P EST MOD 30 MIN: CPT | Mod: S$GLB,,, | Performed by: FAMILY MEDICINE

## 2020-03-10 PROCEDURE — 3008F PR BODY MASS INDEX (BMI) DOCUMENTED: ICD-10-PCS | Mod: S$GLB,,, | Performed by: FAMILY MEDICINE

## 2020-03-10 RX ORDER — METHYLPREDNISOLONE 4 MG/1
TABLET ORAL
Qty: 1 PACKAGE | Refills: 0 | Status: SHIPPED | OUTPATIENT
Start: 2020-03-10 | End: 2020-03-31

## 2020-03-10 RX ORDER — HYDROCODONE BITARTRATE AND ACETAMINOPHEN 5; 325 MG/1; MG/1
1 TABLET ORAL EVERY 6 HOURS PRN
Qty: 28 TABLET | Refills: 0 | Status: SHIPPED | OUTPATIENT
Start: 2020-03-10 | End: 2020-03-17

## 2020-03-10 RX ORDER — HYDROCODONE BITARTRATE AND ACETAMINOPHEN 5; 325 MG/1; MG/1
1 TABLET ORAL EVERY 6 HOURS PRN
Qty: 28 TABLET | Refills: 0 | Status: SHIPPED | OUTPATIENT
Start: 2020-03-10 | End: 2020-03-10

## 2020-03-10 RX ORDER — METHYLPREDNISOLONE 4 MG/1
TABLET ORAL
Qty: 1 PACKAGE | Refills: 0 | Status: SHIPPED | OUTPATIENT
Start: 2020-03-10 | End: 2020-03-10

## 2020-03-10 NOTE — PROGRESS NOTES
SUBJECTIVE:    Patient ID: Becky Sy is a 44 y.o. female.    Chief Complaint: Allergic Rhinitis  (check up); Bottles not brought; and Back Pain    Pt here to checkup on chronic conditions.      Allergies are doing well.     S/p hysterectomy, having issues with her bladder. Has pain before, but not during urination. Has been to the hospital afterwards and has had a U/A. Unsure if bladder spasms, has been rx pyridium once. Worse if holds urine.    Anxiety is doing ok, on 250mg luvox daily, and xanax daily.  Increases when her back hurts, from working.  Was on her feet more this week twice while working.  Has been told she has annual tears, and spinal stenosis.  States she has tried finding pain management in the past but had difficulty due to insurance.      Continues to smoke regularly.  Ready to quit, has tried to stop in the past without aid, can for a few days but goes right back.    Hand pain returned recently after taking her pain pill, percocet, recently, the only other time she had it was when she took it after her hysterectomy.  Wonders if there is a correlation.        Has had labs done.  LDL 70, nl HbA1c      Office Visit on 11/13/2019   Component Date Value Ref Range Status    Cholesterol 03/04/2020 194  <200 mg/dL Final    HDL 03/04/2020 67  > OR = 50 mg/dL Final    Triglycerides 03/04/2020 70  <150 mg/dL Final    LDL Cholesterol 03/04/2020 111* mg/dL (calc) Final    Hdl/Cholesterol Ratio 03/04/2020 2.9  <5.0 (calc) Final    Non HDL Chol. (LDL+VLDL) 03/04/2020 127  <130 mg/dL (calc) Final    Glucose 03/04/2020 91  65 - 99 mg/dL Final    BUN, Bld 03/04/2020 10  7 - 25 mg/dL Final    Creatinine 03/04/2020 0.71  0.50 - 1.10 mg/dL Final    eGFR if non African American 03/04/2020 104  > OR = 60 mL/min/1.73m2 Final    eGFR if African American 03/04/2020 121  > OR = 60 mL/min/1.73m2 Final    BUN/Creatinine Ratio 03/04/2020 NOT APPLICABLE  6 - 22 (calc) Final    Sodium 03/04/2020 142  135  - 146 mmol/L Final    Potassium 03/04/2020 4.4  3.5 - 5.3 mmol/L Final    Chloride 03/04/2020 109  98 - 110 mmol/L Final    CO2 03/04/2020 28  20 - 32 mmol/L Final    Calcium 03/04/2020 9.2  8.6 - 10.2 mg/dL Final    Total Protein 03/04/2020 6.3  6.1 - 8.1 g/dL Final    Albumin 03/04/2020 4.2  3.6 - 5.1 g/dL Final    Globulin, Total 03/04/2020 2.1  1.9 - 3.7 g/dL (calc) Final    Albumin/Globulin Ratio 03/04/2020 2.0  1.0 - 2.5 (calc) Final    Total Bilirubin 03/04/2020 0.5  0.2 - 1.2 mg/dL Final    Alkaline Phosphatase 03/04/2020 55  31 - 125 U/L Final    AST 03/04/2020 15  10 - 30 U/L Final    ALT 03/04/2020 14  6 - 29 U/L Final    WBC 03/04/2020 4.3  3.8 - 10.8 Thousand/uL Final    RBC 03/04/2020 4.34  3.80 - 5.10 Million/uL Final    Hemoglobin 03/04/2020 13.4  11.7 - 15.5 g/dL Final    Hematocrit 03/04/2020 39.3  35.0 - 45.0 % Final    Mean Corpuscular Volume 03/04/2020 90.6  80.0 - 100.0 fL Final    Mean Corpuscular Hemoglobin 03/04/2020 30.9  27.0 - 33.0 pg Final    Mean Corpuscular Hemoglobin Conc 03/04/2020 34.1  32.0 - 36.0 g/dL Final    RDW 03/04/2020 13.7  11.0 - 15.0 % Final    Platelets 03/04/2020 279  140 - 400 Thousand/uL Final    MPV 03/04/2020 9.9  7.5 - 12.5 fL Final    Neutrophils Absolute 03/04/2020 2,675  1,500 - 7,800 cells/uL Final    Lymph # 03/04/2020 1,376  850 - 3,900 cells/uL Final    Mono # 03/04/2020 211  200 - 950 cells/uL Final    Eos # 03/04/2020 30  15 - 500 cells/uL Final    Baso # 03/04/2020 9  0 - 200 cells/uL Final    Neutrophils Relative 03/04/2020 62.2  % Final    Lymph% 03/04/2020 32.0  % Final    Mono% 03/04/2020 4.9  % Final    Eosinophil% 03/04/2020 0.7  % Final    Basophil% 03/04/2020 0.2  % Final    Hemoglobin A1C 03/04/2020 5.3  <5.7 % of total Hgb Final   Admission on 10/03/2019, Discharged on 10/03/2019   Component Date Value Ref Range Status    WBC 10/03/2019 4.76  3.90 - 12.70 K/uL Final    RBC 10/03/2019 3.84* 4.00 - 5.40 M/uL  Final    Hemoglobin 10/03/2019 11.0* 12.0 - 16.0 g/dL Final    Hematocrit 10/03/2019 34.5* 37.0 - 48.5 % Final    Mean Corpuscular Volume 10/03/2019 90  82 - 98 fL Final    Mean Corpuscular Hemoglobin 10/03/2019 28.6  27.0 - 31.0 pg Final    Mean Corpuscular Hemoglobin Conc 10/03/2019 31.9* 32.0 - 36.0 g/dL Final    RDW 10/03/2019 14.3  11.5 - 14.5 % Final    Platelets 10/03/2019 322  150 - 350 K/uL Final    MPV 10/03/2019 9.6  9.2 - 12.9 fL Final    Immature Granulocytes 10/03/2019 0.4  0.0 - 0.5 % Final    Gran # (ANC) 10/03/2019 3.5  1.8 - 7.7 K/uL Final    Immature Grans (Abs) 10/03/2019 0.02  0.00 - 0.04 K/uL Final    Lymph # 10/03/2019 1.0  1.0 - 4.8 K/uL Final    Mono # 10/03/2019 0.2* 0.3 - 1.0 K/uL Final    Eos # 10/03/2019 0.0  0.0 - 0.5 K/uL Final    Baso # 10/03/2019 0.02  0.00 - 0.20 K/uL Final    nRBC 10/03/2019 0  0 /100 WBC Final    Gran% 10/03/2019 73.6* 38.0 - 73.0 % Final    Lymph% 10/03/2019 20.8  18.0 - 48.0 % Final    Mono% 10/03/2019 4.2  4.0 - 15.0 % Final    Eosinophil% 10/03/2019 0.6  0.0 - 8.0 % Final    Basophil% 10/03/2019 0.4  0.0 - 1.9 % Final    Differential Method 10/03/2019 Automated   Final    Sodium 10/03/2019 136  136 - 145 mmol/L Final    Potassium 10/03/2019 3.9  3.5 - 5.1 mmol/L Final    Chloride 10/03/2019 101  95 - 110 mmol/L Final    CO2 10/03/2019 26  23 - 29 mmol/L Final    Glucose 10/03/2019 112* 70 - 110 mg/dL Final    BUN, Bld 10/03/2019 11  6 - 20 mg/dL Final    Creatinine 10/03/2019 0.7  0.5 - 1.4 mg/dL Final    Calcium 10/03/2019 8.7  8.7 - 10.5 mg/dL Final    Total Protein 10/03/2019 7.1  6.0 - 8.4 g/dL Final    Albumin 10/03/2019 3.9  3.5 - 5.2 g/dL Final    Total Bilirubin 10/03/2019 0.5  0.1 - 1.0 mg/dL Final    Alkaline Phosphatase 10/03/2019 61  55 - 135 U/L Final    AST 10/03/2019 16  10 - 40 U/L Final    ALT 10/03/2019 13  10 - 44 U/L Final    Anion Gap 10/03/2019 9  8 - 16 mmol/L Final    eGFR if   10/03/2019 >60.0  >60 mL/min/1.73 m^2 Final    eGFR if non African American 10/03/2019 >60.0  >60 mL/min/1.73 m^2 Final    Lipase 10/03/2019 31  4 - 60 U/L Final    Specimen UA 10/03/2019 Urine, Clean Catch   Final    Color, UA 10/03/2019 Yellow  Yellow, Straw, Mariela Final    Appearance, UA 10/03/2019 Clear  Clear Final    pH, UA 10/03/2019 7.0  5.0 - 8.0 Final    Specific Gravity, UA 10/03/2019 1.025  1.005 - 1.030 Final    Protein, UA 10/03/2019 Trace* Negative Final    Glucose, UA 10/03/2019 Negative  Negative Final    Ketones, UA 10/03/2019 Negative  Negative Final    Bilirubin (UA) 10/03/2019 Negative  Negative Final    Occult Blood UA 10/03/2019 Negative  Negative Final    Nitrite, UA 10/03/2019 Negative  Negative Final    Urobilinogen, UA 10/03/2019 2.0-3.0* Negative EU/dL Final    Leukocytes, UA 10/03/2019 1+* Negative Final    RBC, UA 10/03/2019 10* 0 - 4 /hpf Final    WBC, UA 10/03/2019 2  0 - 5 /hpf Final    Bacteria 10/03/2019 Negative  None-Occ /hpf Final    Squam Epithel, UA 10/03/2019 6  /hpf Final    Hyaline Casts, UA 10/03/2019 11* 0-1/lpf /lpf Final    Microscopic Comment 10/03/2019 SEE COMMENT   Final   Admission on 09/17/2019, Discharged on 09/18/2019   Component Date Value Ref Range Status    Preg, Serum 09/17/2019 Negative   Final    POC Preg Test, Ur 09/17/2019 Negative  Negative Final     Acceptable 09/17/2019 Yes   Final    WBC 09/17/2019 8.03  3.90 - 12.70 K/uL Final    RBC 09/17/2019 3.34* 4.00 - 5.40 M/uL Final    Hemoglobin 09/17/2019 9.8* 12.0 - 16.0 g/dL Final    Hematocrit 09/17/2019 30.9* 37.0 - 48.5 % Final    Mean Corpuscular Volume 09/17/2019 93  82 - 98 fL Final    Mean Corpuscular Hemoglobin 09/17/2019 29.3  27.0 - 31.0 pg Final    Mean Corpuscular Hemoglobin Conc 09/17/2019 31.7* 32.0 - 36.0 g/dL Final    RDW 09/17/2019 14.9* 11.5 - 14.5 % Final    Platelets 09/17/2019 256  150 - 350 K/uL Final    MPV 09/17/2019 10.3  9.2 - 12.9  fL Final    Immature Granulocytes 09/17/2019 0.2  0.0 - 0.5 % Final    Gran # (ANC) 09/17/2019 7.4  1.8 - 7.7 K/uL Final    Immature Grans (Abs) 09/17/2019 0.02  0.00 - 0.04 K/uL Final    Lymph # 09/17/2019 0.5* 1.0 - 4.8 K/uL Final    Mono # 09/17/2019 0.1* 0.3 - 1.0 K/uL Final    Eos # 09/17/2019 0.0  0.0 - 0.5 K/uL Final    Baso # 09/17/2019 0.01  0.00 - 0.20 K/uL Final    nRBC 09/17/2019 0  0 /100 WBC Final    Gran% 09/17/2019 92.5* 38.0 - 73.0 % Final    Lymph% 09/17/2019 6.1* 18.0 - 48.0 % Final    Mono% 09/17/2019 1.1* 4.0 - 15.0 % Final    Eosinophil% 09/17/2019 0.0  0.0 - 8.0 % Final    Basophil% 09/17/2019 0.1  0.0 - 1.9 % Final    Differential Method 09/17/2019 Automated   Final   Lab Visit on 09/13/2019   Component Date Value Ref Range Status    Group & Rh 09/13/2019 O POS   Final    Indirect Andrés 09/13/2019 NEG   Final       Past Medical History:   Diagnosis Date    Allergy     Anxiety     Anxiety 1994    on meds    Arthritis 1990    knees miley, miley thumbs    Depression     Fibroids 09/03/2019    Dr Turpin    Menorrhagia 09/03/2019    Dr Turpin    V-tach 2007    resolved itself     Past Surgical History:   Procedure Laterality Date    ARTHROSCOPIC CHONDROPLASTY OF KNEE JOINT Left 1997    BACK SURGERY  2007    jerrell    BREAST BIOPSY Left 1996    ECHOCARDIOGRAPHY  2007    HYSTERECTOMY      laparascopic  1998    diagnostic to remove endometriosis    LAPAROSCOPY-ASSISTED VAGINAL HYSTERECTOMY N/A 9/17/2019    Procedure: HYSTERECTOMY, VAGINAL, LAPAROSCOPY-ASSISTED;  Surgeon: Isabella Turpin MD;  Location: [facility];  Service: OB/GYN;  Laterality: N/A;    stess test  2007     History reviewed. No pertinent family history.    Marital Status: Legally   Alcohol History:  reports that she drinks alcohol.  Tobacco History:  reports that she has been smoking cigarettes. She has a 10.00 pack-year smoking history. She has never used smokeless tobacco.  Drug History:  reports that  she does not use drugs.    Review of patient's allergies indicates:  No Known Allergies    Current Outpatient Medications:     ALPRAZolam (XANAX) 0.5 MG tablet, Take 1 tablet (0.5 mg total) by mouth 2 (two) times daily., Disp: 180 tablet, Rfl: 0    CANNABIDIOL, CBD, EXTRACT ORAL, Place under the tongue once daily., Disp: , Rfl:     fluticasone propionate (FLONASE) 50 mcg/actuation nasal spray, 1 spray by Each Nostril route once daily., Disp: , Rfl:     methocarbamol (ROBAXIN) 750 MG Tab, Take 750 mg by mouth as needed. , Disp: , Rfl:     HYDROcodone-acetaminophen (NORCO) 5-325 mg per tablet, Take 1 tablet by mouth every 6 (six) hours as needed for Pain., Disp: 28 tablet, Rfl: 0    ibuprofen (ADVIL,MOTRIN) 600 MG tablet, Take 1 tablet (600 mg total) by mouth every 6 (six) hours as needed., Disp: , Rfl: 0    methylPREDNISolone (MEDROL DOSEPACK) 4 mg tablet, use as directed, Disp: 1 Package, Rfl: 0    multivitamin capsule, Take 1 capsule by mouth once daily., Disp: , Rfl:     Review of Systems   Constitutional: Negative for appetite change, fatigue, fever and unexpected weight change.   Respiratory: Negative for cough, chest tightness, shortness of breath and wheezing.    Cardiovascular: Negative for chest pain and leg swelling.   Gastrointestinal: Negative for abdominal pain, constipation, nausea and vomiting.        -heartburn   Genitourinary: Negative for difficulty urinating, dysuria, frequency and urgency.        +bladder pain   Musculoskeletal: Positive for back pain. Negative for arthralgias, myalgias and neck pain.   Skin: Negative for rash.   Allergic/Immunologic: Positive for environmental allergies.   Neurological: Negative for dizziness, weakness, numbness and headaches.   Hematological: Does not bruise/bleed easily.   Psychiatric/Behavioral: Negative for dysphoric mood, sleep disturbance and suicidal ideas. The patient is nervous/anxious.    All other systems reviewed and are negative.      "    Objective:      Vitals:    03/10/20 1002   BP: 108/72   Pulse: 102   SpO2: 98%   Weight: 70.9 kg (156 lb 3.2 oz)   Height: 5' 6" (1.676 m)     Body mass index is 25.21 kg/m².     Physical Exam   Constitutional: She is oriented to person, place, and time. She appears well-developed and well-nourished. No distress.   thin   HENT:   Head: Normocephalic and atraumatic.   Neck: Neck supple. No thyromegaly present.   Cardiovascular: Normal rate, regular rhythm and normal heart sounds. Exam reveals no friction rub.   No murmur heard.  Pulmonary/Chest: Effort normal and breath sounds normal. She has no wheezes. She has no rales.   Abdominal: Soft. Bowel sounds are normal. She exhibits no distension. There is tenderness in the suprapubic area.   Musculoskeletal: She exhibits no edema.        Lumbar back: She exhibits tenderness and bony tenderness. She exhibits no swelling and no pain.        Back:    Pos mod SLR    Lymphadenopathy:     She has no cervical adenopathy.   Neurological: She is alert and oriented to person, place, and time.   Skin: Skin is warm and dry. No rash noted.   Psychiatric: She has a normal mood and affect. Her speech is normal and behavior is normal. Judgment and thought content normal. She is attentive.   Vitals reviewed.        Assessment:       1. DELL (generalized anxiety disorder)    2. Allergic rhinitis, unspecified seasonality, unspecified trigger    3. Bladder pain    4. Chronic right-sided low back pain without sciatica    5. Smoker         Plan:       DELL (generalized anxiety disorder)  Comments:  Controlled. Will continue to monitor.    Allergic rhinitis, unspecified seasonality, unspecified trigger  Comments:  Controlled. Will continue to monitor symptoms    Bladder pain  Comments:  Reccurent. Will check for infection. May need to see urology.  Orders:  -     Urinalysis, Reflex to Urine Culture Urine, Clean Catch; Future; Expected date: 03/10/2020    Chronic right-sided low back pain " without sciatica  Comments:  Acute on chronic. Continue conservative care with ice/heat, robaxin, aleve and oxycodone sparingly. Will give medrol dose pack  Orders:  -     methylPREDNISolone (MEDROL DOSEPACK) 4 mg tablet; use as directed  Dispense: 1 Package; Refill: 0  -     HYDROcodone-acetaminophen (NORCO) 5-325 mg per tablet; Take 1 tablet by mouth every 6 (six) hours as needed for Pain.  Dispense: 28 tablet; Refill: 0    Smoker  Comments:  Pt strongly encouraged to stop smoking. Pt would like to attend smoking cessation class  Orders:  -     Ambulatory referral/consult to Smoking Cessation Program; Future; Expected date: 03/17/2020    Other  Lab results discussed and reviewed with patient.    Chronic Pain Notes:  Have discussed the risks and benefits of chronic narcotic therapy including pain control, dependence, and addiction.  The patient is aware and accepts the risks and benefits.  Patient is aware of the risk of taking narcotics combined with benzodiazepines/muscle relaxers/hypnotics, including but not limited to breathing difficulties, coma, and death; accepts the risks; and agrees to use medications only as prescribed.    Follow up in about 4 months (around 7/10/2020) for Anxiety, Allergies.

## 2020-03-11 LAB
APPEARANCE UR: CLEAR
BACTERIA #/AREA URNS HPF: NORMAL /HPF
BACTERIA UR CULT: NORMAL
BILIRUB UR QL STRIP: NEGATIVE
COLOR UR: YELLOW
GLUCOSE UR QL STRIP: NEGATIVE
HGB UR QL STRIP: NEGATIVE
HYALINE CASTS #/AREA URNS LPF: NORMAL /LPF
KETONES UR QL STRIP: NEGATIVE
LEUKOCYTE ESTERASE UR QL STRIP: NEGATIVE
NITRITE UR QL STRIP: NEGATIVE
PH UR STRIP: 6.5 [PH] (ref 5–8)
PROT UR QL STRIP: NEGATIVE
RBC #/AREA URNS HPF: NORMAL /HPF
SP GR UR STRIP: 1.01 (ref 1–1.03)
SQUAMOUS #/AREA URNS HPF: NORMAL /HPF
WBC #/AREA URNS HPF: NORMAL /HPF

## 2020-03-17 ENCOUNTER — TELEPHONE (OUTPATIENT)
Dept: FAMILY MEDICINE | Facility: CLINIC | Age: 45
End: 2020-03-17

## 2020-03-17 DIAGNOSIS — R39.89 BLADDER PAIN: Primary | ICD-10-CM

## 2020-03-17 NOTE — PROGRESS NOTES
Spoke to patient with normal urinalysis result. Wants to know what she should do about the bladder pain.

## 2020-03-17 NOTE — TELEPHONE ENCOUNTER
----- Message from Pili Aguirre MD sent at 3/16/2020  3:54 PM CDT -----  Let pt know that     1. The urinalysis is normal.

## 2020-03-17 NOTE — TELEPHONE ENCOUNTER
Spoke to patient that Dr Aguirre ordered ultrasound at [facility] and they will be calling to schedule her.

## 2020-03-24 DIAGNOSIS — R39.89 BLADDER PAIN: Primary | ICD-10-CM

## 2020-03-24 NOTE — TELEPHONE ENCOUNTER
Let pt know that while she waits for her appt, she can try flavoxate for bladder spasms.  ## please confirm pharm

## 2020-03-24 NOTE — TELEPHONE ENCOUNTER
Please give Allyssa a call back at [phone], she needs to know if you still want the pt to have her us now or can it be rescheduled?

## 2020-03-24 NOTE — TELEPHONE ENCOUNTER
Unable to reach pt - LMOR for pt to return call. Pt scheduled for u/s on 4/28 at 8:30am. FRANCESCO

## 2020-03-26 RX ORDER — FLAVOXATE HYDROCHLORIDE 100 MG/1
100 TABLET ORAL 3 TIMES DAILY PRN
Qty: 90 TABLET | Refills: 2 | Status: SHIPPED | OUTPATIENT
Start: 2020-03-26 | End: 2021-03-23

## 2020-03-26 NOTE — TELEPHONE ENCOUNTER
Spoke with pt - Pt informed that her u/s is scheduled for 4/28 at 8:30 am. Pt verbalized an understanding. FRANCESCO

## 2020-03-26 NOTE — TELEPHONE ENCOUNTER
The patient's prescription has been approved and sent to   [facility]/pharmacy #[id number] - [place], [place] - [address]  [address] [address]  Phone: [phone] Fax: [phone]

## 2020-04-02 ENCOUNTER — PATIENT MESSAGE (OUTPATIENT)
Dept: FAMILY MEDICINE | Facility: CLINIC | Age: 45
End: 2020-04-02

## 2020-04-02 DIAGNOSIS — F41.9 ANXIETY: ICD-10-CM

## 2020-04-02 RX ORDER — ALPRAZOLAM 0.5 MG/1
0.5 TABLET ORAL 3 TIMES DAILY
Qty: 90 TABLET | Refills: 0 | Status: SHIPPED | OUTPATIENT
Start: 2020-04-02 | End: 2020-05-29 | Stop reason: SDUPTHER

## 2020-04-02 NOTE — TELEPHONE ENCOUNTER
Spoke with pt - Pt informed of Dr. Aguirre's change in her medications and Dr. Aguirre's recommendations to for trying to calm down. Pt states that she doesn't see any psychiatrist at the moment. Pt verbalized an understanding of our conversation.     Pt inquiring a bout the first issue she asked about in portal message.  FRANCESCO

## 2020-04-02 NOTE — TELEPHONE ENCOUNTER
Will increase xanax to 0.5mg tid. Otherwise she is going to need to practice other means of calming herself. There are several coping hotlines, meditation, gentle exercise. But the answer is not developing an increased tolerance to xanax. Though very difficult right now, if she has any psychiatric specialist, she needs to reach out to them right now as well, as she has had some issues with meds in the past.

## 2020-04-02 NOTE — TELEPHONE ENCOUNTER
The patient's prescription has been approved and sent to   [facility]/pharmacy #[id number] - [place], [place] - [address]  [address] [address]  Phone: [phone] Fax: [phone]

## 2020-04-03 ENCOUNTER — PATIENT MESSAGE (OUTPATIENT)
Dept: FAMILY MEDICINE | Facility: CLINIC | Age: 45
End: 2020-04-03

## 2020-04-03 DIAGNOSIS — B37.31 VAGINAL YEAST INFECTION: Primary | ICD-10-CM

## 2020-04-03 RX ORDER — FLUCONAZOLE 150 MG/1
150 TABLET ORAL DAILY
Qty: 1 TABLET | Refills: 0 | Status: SHIPPED | OUTPATIENT
Start: 2020-04-03 | End: 2020-08-31 | Stop reason: SDUPTHER

## 2020-04-03 NOTE — TELEPHONE ENCOUNTER
The patient's prescription has been approved and sent to   [facility]/pharmacy #[id number] - [place], [place] - [address]  [address] [address]  Phone: [phone] Fax: [phone]

## 2020-04-27 ENCOUNTER — TELEPHONE (OUTPATIENT)
Dept: FAMILY MEDICINE | Facility: CLINIC | Age: 45
End: 2020-04-27

## 2020-04-27 NOTE — TELEPHONE ENCOUNTER
----- Message from Lindy Lee sent at 4/27/2020  3:08 PM CDT -----  Contact: TAL FROM [facility]  The patient has 2 u/s  tomorrow at  8:30 Pls call about the order. ASAP. She sent a message in Epic This morning.  TAL  # [phone]

## 2020-04-27 NOTE — TELEPHONE ENCOUNTER
Spoke with Lizzy at [facility] - Pt u/s is routine according to order. Pt will be rescheduled. FRANCESCO

## 2020-05-18 ENCOUNTER — HOSPITAL ENCOUNTER (OUTPATIENT)
Dept: RADIOLOGY | Facility: HOSPITAL | Age: 45
Discharge: HOME | End: 2020-05-18
Attending: FAMILY MEDICINE
Payer: COMMERCIAL

## 2020-05-18 DIAGNOSIS — R39.89 BLADDER PAIN: ICD-10-CM

## 2020-05-18 PROCEDURE — 76770 US EXAM ABDO BACK WALL COMP: CPT | Mod: TC,PO

## 2020-05-18 PROCEDURE — 76857 US EXAM PELVIC LIMITED: CPT | Mod: TC,PO

## 2020-05-20 ENCOUNTER — TELEPHONE (OUTPATIENT)
Dept: FAMILY MEDICINE | Facility: CLINIC | Age: 45
End: 2020-05-20

## 2020-05-20 NOTE — TELEPHONE ENCOUNTER
----- Message from Pili Aguirre MD sent at 5/20/2020  1:44 PM CDT -----  Let pt know that bladder ultrasound    1. Is normal. She is not retaining an abnormal amount of urine in the bladder

## 2020-05-21 ENCOUNTER — TELEPHONE (OUTPATIENT)
Dept: FAMILY MEDICINE | Facility: CLINIC | Age: 45
End: 2020-05-21

## 2020-05-21 NOTE — TELEPHONE ENCOUNTER
----- Message from Piil Aguirre MD sent at 5/20/2020  1:44 PM CDT -----  Let pt know that bladder ultrasound    1. Is normal. She is not retaining an abnormal amount of urine in the bladder

## 2020-05-29 ENCOUNTER — PATIENT MESSAGE (OUTPATIENT)
Dept: FAMILY MEDICINE | Facility: CLINIC | Age: 45
End: 2020-05-29

## 2020-05-29 DIAGNOSIS — F41.9 ANXIETY: ICD-10-CM

## 2020-05-29 RX ORDER — ALPRAZOLAM 0.5 MG/1
0.5 TABLET ORAL 3 TIMES DAILY
Qty: 270 TABLET | Refills: 0 | Status: SHIPPED | OUTPATIENT
Start: 2020-05-29 | End: 2020-08-31 | Stop reason: SDUPTHER

## 2020-05-29 NOTE — TELEPHONE ENCOUNTER
The patient's prescription has been approved and sent to   [facility]/pharmacy #[id number] - [place], [place] - [address]  [address] [address]  Phone: [phone] Fax: [phone]

## 2020-07-13 ENCOUNTER — OFFICE VISIT (OUTPATIENT)
Dept: FAMILY MEDICINE | Facility: CLINIC | Age: 45
End: 2020-07-13
Payer: COMMERCIAL

## 2020-07-13 DIAGNOSIS — F17.200 SMOKER: ICD-10-CM

## 2020-07-13 DIAGNOSIS — M54.50 CHRONIC RIGHT-SIDED LOW BACK PAIN WITHOUT SCIATICA: ICD-10-CM

## 2020-07-13 DIAGNOSIS — J30.9 ALLERGIC RHINITIS, UNSPECIFIED SEASONALITY, UNSPECIFIED TRIGGER: Primary | ICD-10-CM

## 2020-07-13 DIAGNOSIS — F41.1 GAD (GENERALIZED ANXIETY DISORDER): ICD-10-CM

## 2020-07-13 DIAGNOSIS — G89.29 CHRONIC RIGHT-SIDED LOW BACK PAIN WITHOUT SCIATICA: ICD-10-CM

## 2020-07-13 PROCEDURE — 99213 OFFICE O/P EST LOW 20 MIN: CPT | Mod: 95,,, | Performed by: FAMILY MEDICINE

## 2020-07-13 PROCEDURE — 99213 PR OFFICE/OUTPT VISIT, EST, LEVL III, 20-29 MIN: ICD-10-PCS | Mod: 95,,, | Performed by: FAMILY MEDICINE

## 2020-07-13 RX ORDER — FLUTICASONE PROPIONATE 50 MCG
1 SPRAY, SUSPENSION (ML) NASAL DAILY
Qty: 18.2 ML | Refills: 3 | Status: SHIPPED | OUTPATIENT
Start: 2020-07-13 | End: 2021-05-06 | Stop reason: SDUPTHER

## 2020-07-13 NOTE — PROGRESS NOTES
Subjective:        The chief complaint leading to consultation is: Allergies, Anxiety, Arthritis  The patient location is:  Home  Visit type: Virtual visit with synchronous audio/video or audio only  This was a video visit in lieu of in-person visit due to the coronavirus emergency. Patient acknowledged and consented to the video visit encounter.     Pt seen to checkup on chronic conditions.    Allergies are doing ok, doing flonase at night.  Hasn't really been outside much.    S/p hysterectomy, her bladder has been doing a lot better since cleared up the yeast infection. No longer having bladder pain or spasms.     Anxiety is doing ok, no longer taking luvox (since May 2019) Only taking xanax. Hasn't been sleeping well. Trouble falling asleep, but once she gets to sleep, she does ok, and then is sleeping later.     Has back is like a constant dull ache. Taking ibuprofen for it.  Uses heating pad sometimes. Doesn't use pain meds that often. Has been told she has annual tears, and spinal stenosis.  States she has tried finding pain management in the past but had difficulty due to insurance.      Continues to smoke regularly.  Was on a good track to quitting before COVID and now has resumed.    Was exercising before partial hysterectomy and has gained 15 pounds.  Has bought a total gym, but has not started using it.            Past Surgical History:   Procedure Laterality Date    ARTHROSCOPIC CHONDROPLASTY OF KNEE JOINT Left 1997    BACK SURGERY  2007    jerrell    BREAST BIOPSY Left 1996    ECHOCARDIOGRAPHY  2007    HYSTERECTOMY      laparascopic  1998    diagnostic to remove endometriosis    LAPAROSCOPY-ASSISTED VAGINAL HYSTERECTOMY N/A 9/17/2019    Procedure: HYSTERECTOMY, VAGINAL, LAPAROSCOPY-ASSISTED;  Surgeon: Isabella Turpin MD;  Location: [facility];  Service: OB/GYN;  Laterality: N/A;    stess test  2007     Past Medical History:   Diagnosis Date    Allergy     Anxiety     Anxiety 1994    on meds     Arthritis 1990    knees miley, miley thumbs    Depression     Fibroids 09/03/2019    Dr Turpin    Menorrhagia 09/03/2019    Dr Turpin    V-tach 2007    resolved itself     History reviewed. No pertinent family history.     Social History:   Marital Status: Legally   Alcohol History:  reports current alcohol use.  Tobacco History:  reports that she has been smoking cigarettes. She has a 10.00 pack-year smoking history. She has never used smokeless tobacco.  Drug History:  reports no history of drug use.    Review of patient's allergies indicates:  No Known Allergies    Current Outpatient Medications   Medication Sig Dispense Refill    ALPRAZolam (XANAX) 0.5 MG tablet Take 1 tablet (0.5 mg total) by mouth 3 (three) times daily. 270 tablet 0    CANNABIDIOL, CBD, EXTRACT ORAL Place under the tongue once daily.      fluticasone propionate (FLONASE) 50 mcg/actuation nasal spray 1 spray (50 mcg total) by Each Nostril route once daily. 18.2 mL 3    ibuprofen (ADVIL,MOTRIN) 600 MG tablet Take 1 tablet (600 mg total) by mouth every 6 (six) hours as needed.  0    methocarbamol (ROBAXIN) 750 MG Tab Take 750 mg by mouth as needed.       multivitamin capsule Take 1 capsule by mouth once daily.      flavoxATE (URISPAS) 100 mg Tab Take 1 tablet (100 mg total) by mouth 3 (three) times daily as needed (bladder pain). 90 tablet 2     No current facility-administered medications for this visit.        Review of Systems   Constitutional: Negative for appetite change, fatigue, fever and unexpected weight change.   HENT: Positive for congestion and sneezing. Negative for postnasal drip and rhinorrhea.    Respiratory: Negative for cough, chest tightness, shortness of breath and wheezing.    Cardiovascular: Negative for chest pain and leg swelling.   Gastrointestinal: Negative for abdominal pain, constipation, nausea and vomiting.        -heartburn   Genitourinary: Negative for difficulty urinating, dysuria, frequency and  urgency.   Musculoskeletal: Positive for back pain and neck pain. Negative for arthralgias and myalgias.   Skin: Negative for rash.   Allergic/Immunologic: Positive for environmental allergies.   Neurological: Negative for dizziness, weakness, numbness and headaches.   Hematological: Does not bruise/bleed easily.   Psychiatric/Behavioral: Positive for sleep disturbance. Negative for dysphoric mood and suicidal ideas. The patient is nervous/anxious.    All other systems reviewed and are negative.        Objective:        Physical Exam:   Physical Exam         Assessment:       1. Allergic rhinitis, unspecified seasonality, unspecified trigger    2. DELL (generalized anxiety disorder)    3. Chronic right-sided low back pain without sciatica    4. Smoker      Plan:   Allergic rhinitis, unspecified seasonality, unspecified trigger  Comments:  Controlled. To continue flonase. Will continue to monitor symptoms.  Orders:  -     fluticasone propionate (FLONASE) 50 mcg/actuation nasal spray; 1 spray (50 mcg total) by Each Nostril route once daily.  Dispense: 18.2 mL; Refill: 3    DELL (generalized anxiety disorder)  Comments:  Controlled. Will continue to monitor.    Chronic right-sided low back pain without sciatica  Comments:  Chronic. Pt to continue to follow with pain management.    Smoker  Comments:  Pt strongly encouraged to stop smoking      Follow up in about 4 months (around 11/13/2020) for Allergies, Anxiety, Chronic Pain.    Total time spent with patient: 20 min    Each patient to whom he or she provides medical services by telemedicine is:  (1) informed of the relationship between the physician and patient and the respective role of any other health care provider with respect to management of the patient; and (2) notified that he or she may decline to receive medical services by telemedicine and may withdraw from such care at any time.    This note was created using [employer] voice recognition software that  occasionally misinterprets phrases or words.

## 2020-08-31 ENCOUNTER — PATIENT MESSAGE (OUTPATIENT)
Dept: FAMILY MEDICINE | Facility: CLINIC | Age: 45
End: 2020-08-31

## 2020-08-31 DIAGNOSIS — F41.9 ANXIETY: ICD-10-CM

## 2020-08-31 DIAGNOSIS — B37.31 VAGINAL YEAST INFECTION: ICD-10-CM

## 2020-08-31 RX ORDER — ALPRAZOLAM 0.5 MG/1
0.5 TABLET ORAL 3 TIMES DAILY
Qty: 270 TABLET | Refills: 0 | Status: SHIPPED | OUTPATIENT
Start: 2020-08-31 | End: 2020-12-10 | Stop reason: SDUPTHER

## 2020-08-31 RX ORDER — FLUCONAZOLE 150 MG/1
150 TABLET ORAL DAILY
Qty: 1 TABLET | Refills: 0 | Status: SHIPPED | OUTPATIENT
Start: 2020-08-31 | End: 2020-09-01

## 2020-11-13 ENCOUNTER — OFFICE VISIT (OUTPATIENT)
Dept: FAMILY MEDICINE | Facility: CLINIC | Age: 45
End: 2020-11-13
Payer: COMMERCIAL

## 2020-11-13 VITALS
DIASTOLIC BLOOD PRESSURE: 80 MMHG | SYSTOLIC BLOOD PRESSURE: 112 MMHG | BODY MASS INDEX: 29.03 KG/M2 | HEIGHT: 66 IN | TEMPERATURE: 99 F | WEIGHT: 180.63 LBS | HEART RATE: 77 BPM

## 2020-11-13 DIAGNOSIS — M54.2 CERVICALGIA: ICD-10-CM

## 2020-11-13 DIAGNOSIS — J30.9 ALLERGIC RHINITIS, UNSPECIFIED SEASONALITY, UNSPECIFIED TRIGGER: Primary | ICD-10-CM

## 2020-11-13 DIAGNOSIS — R92.8 ABNORMAL MAMMOGRAM: Primary | ICD-10-CM

## 2020-11-13 DIAGNOSIS — Z13.6 SCREENING FOR ISCHEMIC HEART DISEASE (IHD): ICD-10-CM

## 2020-11-13 DIAGNOSIS — Z79.899 LONG-TERM USE OF HIGH-RISK MEDICATION: ICD-10-CM

## 2020-11-13 DIAGNOSIS — F17.200 SMOKER: ICD-10-CM

## 2020-11-13 DIAGNOSIS — G89.29 CHRONIC RIGHT-SIDED LOW BACK PAIN WITHOUT SCIATICA: ICD-10-CM

## 2020-11-13 DIAGNOSIS — Z13.89 SCREENING FOR BLOOD OR PROTEIN IN URINE: ICD-10-CM

## 2020-11-13 DIAGNOSIS — F41.9 ANXIETY: ICD-10-CM

## 2020-11-13 DIAGNOSIS — R92.8 ABNORMAL MAMMOGRAM OF LEFT BREAST: ICD-10-CM

## 2020-11-13 DIAGNOSIS — M54.50 CHRONIC RIGHT-SIDED LOW BACK PAIN WITHOUT SCIATICA: ICD-10-CM

## 2020-11-13 DIAGNOSIS — Z13.29 SCREENING FOR ENDOCRINE DISORDER: ICD-10-CM

## 2020-11-13 PROCEDURE — 99214 OFFICE O/P EST MOD 30 MIN: CPT | Mod: S$GLB,,, | Performed by: FAMILY MEDICINE

## 2020-11-13 PROCEDURE — 3008F PR BODY MASS INDEX (BMI) DOCUMENTED: ICD-10-PCS | Mod: S$GLB,,, | Performed by: FAMILY MEDICINE

## 2020-11-13 PROCEDURE — 3008F BODY MASS INDEX DOCD: CPT | Mod: S$GLB,,, | Performed by: FAMILY MEDICINE

## 2020-11-13 PROCEDURE — 99214 PR OFFICE/OUTPT VISIT, EST, LEVL IV, 30-39 MIN: ICD-10-PCS | Mod: S$GLB,,, | Performed by: FAMILY MEDICINE

## 2020-11-13 RX ORDER — METHOCARBAMOL 750 MG/1
750 TABLET, FILM COATED ORAL
Qty: 90 TABLET | Refills: 1 | Status: SHIPPED | OUTPATIENT
Start: 2020-11-13 | End: 2021-05-06 | Stop reason: SDUPTHER

## 2020-11-13 NOTE — PROGRESS NOTES
SUBJECTIVE:    Patient ID: Becky Sy is a 44 y.o. female.    Chief Complaint: No chief complaint on file.    Pt seen to checkup on chronic conditions.    Allergies are not that bad.  Taking flonase in the morning and a benadryl at night.     Anxiety is so-so, has been home schooling her 2 young children.  Only taking xanax. (stopped luvox 5/2019)    Her back has been pretty decent. It has its moments but it hasn't gone out. Her neck has been bothering her for a while, since a bathroom stall door was pushed onto her head while she was bent over.  Has not been able to turn her head much since she held her kid on her neck during a parade in March.  Hard to drive or do anything with the left side of her body.  Has not seen anyone, been to ER or anything about it.  States she has tried finding pain management in the past but had difficulty due to insurance.      Continues to smoke regularly.  Was on a good track to quitting before COVID and now has resumed.    Has not been exercising. Has gained 40 pounds since March.    -------------------------------------------  Due for repeat mammo, last 3/2020            Past Medical History:   Diagnosis Date    Allergy     Anxiety     Anxiety 1994    on meds    Arthritis 1990    knees miley, miley thumbs    Depression     Fibroids 09/03/2019    Dr Turpin    Menorrhagia 09/03/2019    Dr Turpin    V-tach 2007    resolved itself     Social History     Socioeconomic History    Marital status: Legally      Spouse name: Not on file    Number of children: Not on file    Years of education: Not on file    Highest education level: Not on file   Occupational History    Not on file   Social Needs    Financial resource strain: Hard    Food insecurity     Worry: Often true     Inability: Sometimes true    Transportation needs     Medical: No     Non-medical: No   Tobacco Use    Smoking status: Current Every Day Smoker     Packs/day: 0.50     Years: 20.00     Pack  years: 10.00     Types: Cigarettes    Smokeless tobacco: Never Used   Substance and Sexual Activity    Alcohol use: Yes     Frequency: 2-3 times a week     Drinks per session: 1 or 2     Binge frequency: Never     Comment: Occasionally    Drug use: No    Sexual activity: Yes     Partners: Male   Lifestyle    Physical activity     Days per week: 3 days     Minutes per session: 20 min    Stress: To some extent   Relationships    Social connections     Talks on phone: More than three times a week     Gets together: Three times a week     Attends Jew service: Not on file     Active member of club or organization: No     Attends meetings of clubs or organizations: Never     Relationship status:    Other Topics Concern    Not on file   Social History Narrative    Not on file     Past Surgical History:   Procedure Laterality Date    ARTHROSCOPIC CHONDROPLASTY OF KNEE JOINT Left 1997    BACK SURGERY  2007    jerrell    BREAST BIOPSY Left 1996    ECHOCARDIOGRAPHY  2007    HYSTERECTOMY      laparascopic  1998    diagnostic to remove endometriosis    LAPAROSCOPY-ASSISTED VAGINAL HYSTERECTOMY N/A 9/17/2019    Procedure: HYSTERECTOMY, VAGINAL, LAPAROSCOPY-ASSISTED;  Surgeon: Isabella Turpin MD;  Location: [facility];  Service: OB/GYN;  Laterality: N/A;    stess test  2007     History reviewed. No pertinent family history.    Review of patient's allergies indicates:  No Known Allergies    Current Outpatient Medications:     ALPRAZolam (XANAX) 0.5 MG tablet, Take 1 tablet (0.5 mg total) by mouth 3 (three) times daily., Disp: 270 tablet, Rfl: 0    CANNABIDIOL, CBD, EXTRACT ORAL, Place under the tongue once daily., Disp: , Rfl:     flavoxATE (URISPAS) 100 mg Tab, Take 1 tablet (100 mg total) by mouth 3 (three) times daily as needed (bladder pain)., Disp: 90 tablet, Rfl: 2    fluticasone propionate (FLONASE) 50 mcg/actuation nasal spray, 1 spray (50 mcg total) by Each Nostril route once daily., Disp:  "18.2 mL, Rfl: 3    ibuprofen (ADVIL,MOTRIN) 600 MG tablet, Take 1 tablet (600 mg total) by mouth every 6 (six) hours as needed., Disp: , Rfl: 0    methocarbamoL (ROBAXIN) 750 MG Tab, Take 1 tablet (750 mg total) by mouth as needed., Disp: 90 tablet, Rfl: 1    multivitamin capsule, Take 1 capsule by mouth once daily., Disp: , Rfl:     Review of Systems   Constitutional: Negative for appetite change, fatigue, fever and unexpected weight change.   HENT: Positive for sneezing. Negative for congestion, postnasal drip and rhinorrhea.    Respiratory: Negative for cough, chest tightness, shortness of breath and wheezing.    Cardiovascular: Negative for chest pain and leg swelling.   Gastrointestinal: Negative for abdominal pain, constipation, nausea and vomiting.        -heartburn   Genitourinary: Negative for difficulty urinating, dysuria, frequency and urgency.   Musculoskeletal: Positive for back pain and neck pain. Negative for arthralgias and myalgias.   Skin: Negative for rash.   Allergic/Immunologic: Positive for environmental allergies.   Neurological: Negative for dizziness, weakness, numbness and headaches.   Hematological: Does not bruise/bleed easily.   Psychiatric/Behavioral: Positive for sleep disturbance. Negative for dysphoric mood and suicidal ideas. The patient is nervous/anxious.    All other systems reviewed and are negative.         Objective:      Vitals:    11/13/20 0950   BP: 112/80   Pulse: 77   Temp: 98.7 °F (37.1 °C)   Weight: 81.9 kg (180 lb 9.6 oz)   Height: 5' 6" (1.676 m)     Wt Readings from Last 3 Encounters:   11/13/20 81.9 kg (180 lb 9.6 oz)   03/10/20 70.9 kg (156 lb 3.2 oz)   03/03/20 66.6 kg (146 lb 13.2 oz)       Physical Exam  Vitals signs reviewed.   Constitutional:       General: She is not in acute distress.     Appearance: Normal appearance. She is well-developed.      Comments: thin   HENT:      Head: Normocephalic and atraumatic.   Neck:      Musculoskeletal: Neck supple. " Decreased range of motion. Pain with movement present. No spinous process tenderness.      Thyroid: No thyromegaly.      Comments: Decreased lateral flexion and rotation to the left.  Cardiovascular:      Rate and Rhythm: Normal rate and regular rhythm.      Heart sounds: Normal heart sounds. No murmur. No friction rub.   Pulmonary:      Effort: Pulmonary effort is normal.      Breath sounds: Normal breath sounds. No wheezing or rales.   Abdominal:      General: Bowel sounds are normal. There is no distension.      Palpations: Abdomen is soft.      Tenderness: There is no abdominal tenderness.   Lymphadenopathy:      Cervical: No cervical adenopathy.   Skin:     General: Skin is warm and dry.      Findings: No rash.   Neurological:      Mental Status: She is alert and oriented to person, place, and time.   Psychiatric:         Attention and Perception: She is attentive.         Speech: Speech normal.         Behavior: Behavior normal.         Thought Content: Thought content normal.         Judgment: Judgment normal.           Assessment:       1. Allergic rhinitis, unspecified seasonality, unspecified trigger    2. Anxiety    3. Cervicalgia    4. Chronic right-sided low back pain without sciatica    5. Abnormal mammogram of left breast    6. Smoker    7. Screening for ischemic heart disease (IHD)    8. Screening for blood or protein in urine    9. Screening for endocrine disorder    10. Long-term use of high-risk medication         Plan:       Allergic rhinitis, unspecified seasonality, unspecified trigger  Comments:  Controlled. Will continue to monitor symptoms on flonase/benadryl    Anxiety  Comments:  Stable. To continue Xanax for now, doesn't wish to resume Luvox. Will continue to monitor symptoms  Orders:  -     Pain Managment Profile 4, w/ Confirm, Ur; Future; Expected date: 11/13/2020    Cervicalgia  Comments:  Symptomatic. Will get Xray, due to trauma hx and recurrent pain.  Pt may benefit from PT. Will  refer to Ortho per pt request  Orders:  -     X-Ray Cervical Spine 2 or 3 Views; Future; Expected date: 11/13/2020  -     Ambulatory referral/consult to Orthopedics; Future; Expected date: 11/21/2020    Chronic right-sided low back pain without sciatica  Comments:  Controlled. Will continue to monitor symtpoms.  Orders:  -     methocarbamoL (ROBAXIN) 750 MG Tab; Take 1 tablet (750 mg total) by mouth as needed.  Dispense: 90 tablet; Refill: 1    Abnormal mammogram of left breast  Comments:  Repeat mammogram order sent to [facility]  Orders:  -     Mammo Digital Diagnostic Left with Edwar; Future; Expected date: 11/13/2020    Smoker  Comments:  Pt strongly encouraged to stop smoking    Screening for ischemic heart disease (IHD)  -     Lipid Panel; Future; Expected date: 11/13/2020  -     Comprehensive Metabolic Panel; Future; Expected date: 11/13/2020    Screening for blood or protein in urine  -     Urinalysis; Future; Expected date: 11/13/2020    Screening for endocrine disorder  -     TSH w/reflex to FT4; Future; Expected date: 11/13/2020    Long-term use of high-risk medication  -     Lipid Panel; Future; Expected date: 11/13/2020  -     Comprehensive Metabolic Panel; Future; Expected date: 11/13/2020  -     Urinalysis; Future; Expected date: 11/13/2020  -     CBC Auto Differential; Future; Expected date: 11/13/2020  -     TSH w/reflex to FT4; Future; Expected date: 11/13/2020  -     Pain Managment Profile 4, w/ Confirm, Ur; Future; Expected date: 11/13/2020    Labs have been ordered for monitoring of chronic conditions, just before next visit.    Follow up in about 3 months (around 2/13/2021) for Anxiety, Allergies, Gout, LABS.        11/14/2020 Pili Aguirre

## 2020-11-23 ENCOUNTER — HOSPITAL ENCOUNTER (OUTPATIENT)
Dept: RADIOLOGY | Facility: HOSPITAL | Age: 45
Discharge: HOME | End: 2020-11-23
Attending: FAMILY MEDICINE
Payer: COMMERCIAL

## 2020-11-23 VITALS — WEIGHT: 180.56 LBS | BODY MASS INDEX: 29.02 KG/M2 | HEIGHT: 66 IN

## 2020-11-23 DIAGNOSIS — R92.8 ABNORMAL MAMMOGRAM OF LEFT BREAST: ICD-10-CM

## 2020-11-23 DIAGNOSIS — M54.2 CERVICALGIA: ICD-10-CM

## 2020-11-23 DIAGNOSIS — R92.8 ABNORMAL MAMMOGRAM: ICD-10-CM

## 2020-11-23 PROCEDURE — 72040 X-RAY EXAM NECK SPINE 2-3 VW: CPT | Mod: TC,PO

## 2020-11-23 PROCEDURE — 77065 DX MAMMO INCL CAD UNI: CPT | Mod: TC,PO,LT

## 2020-12-10 ENCOUNTER — PATIENT MESSAGE (OUTPATIENT)
Dept: FAMILY MEDICINE | Facility: CLINIC | Age: 45
End: 2020-12-10

## 2020-12-10 DIAGNOSIS — F41.9 ANXIETY: ICD-10-CM

## 2020-12-11 RX ORDER — ALPRAZOLAM 0.5 MG/1
0.5 TABLET ORAL 3 TIMES DAILY
Qty: 270 TABLET | Refills: 0 | Status: SHIPPED | OUTPATIENT
Start: 2020-12-11 | End: 2021-03-15 | Stop reason: SDUPTHER

## 2020-12-11 NOTE — TELEPHONE ENCOUNTER
The patient's prescription has been approved and sent to   [facility]/pharmacy #[id number] - [place], [place] - [address]  [address] [address]  Phone: [phone] Fax: [phone]

## 2020-12-15 ENCOUNTER — TELEPHONE (OUTPATIENT)
Dept: FAMILY MEDICINE | Facility: CLINIC | Age: 45
End: 2020-12-15

## 2020-12-15 DIAGNOSIS — M12.9 ARTHROPATHY: ICD-10-CM

## 2020-12-15 DIAGNOSIS — M54.50 CHRONIC RIGHT-SIDED LOW BACK PAIN WITHOUT SCIATICA: Primary | ICD-10-CM

## 2020-12-15 DIAGNOSIS — G89.29 CHRONIC RIGHT-SIDED LOW BACK PAIN WITHOUT SCIATICA: Primary | ICD-10-CM

## 2020-12-15 NOTE — TELEPHONE ENCOUNTER
----- Message from Pili Aguirre MD sent at 12/14/2020 10:50 PM CST -----  Please let the patient know that her mammogram is shows no evidence of malignancy. To repeat in 1 year.

## 2020-12-15 NOTE — TELEPHONE ENCOUNTER
----- Message from Pili Aguirre MD sent at 12/14/2020 10:49 PM CST -----  Please call the patient regarding her abnormal CERVICAL SPINE XRAY result:    1. Minimal intervertebral disc space narrowing, with mild cervical facet arthropathy.    ##She may benefit from PT

## 2020-12-15 NOTE — PROGRESS NOTES
Please call the patient regarding her abnormal CERVICAL SPINE XRAY result:    1. Minimal intervertebral disc space narrowing, with mild cervical facet arthropathy.    ##She may benefit from PT

## 2020-12-15 NOTE — TELEPHONE ENCOUNTER
Spoke with pt, pt stated that a bump has formed on the back of her left hand. Its not bothersome and doesn't hurt but pt stated that it has gotten bigger in the 2 days she's noticed it. She said that she looked it up and it may be a bone spur. Pt wants to know what to do.

## 2020-12-15 NOTE — TELEPHONE ENCOUNTER
Can be seen by NP/PA or next available by me. Nothing to do until lesion seen. If it starts to hurt can use tylenol or topical voltaren

## 2021-03-10 ENCOUNTER — TELEPHONE (OUTPATIENT)
Dept: FAMILY MEDICINE | Facility: CLINIC | Age: 46
End: 2021-03-10

## 2021-03-13 LAB
ALBUMIN SERPL-MCNC: 4.1 G/DL (ref 3.6–5.1)
ALBUMIN/GLOB SERPL: 1.8 (CALC) (ref 1–2.5)
ALP SERPL-CCNC: 69 U/L (ref 31–125)
ALT SERPL-CCNC: 13 U/L (ref 6–29)
APPEARANCE UR: CLEAR
AST SERPL-CCNC: 13 U/L (ref 10–30)
BASOPHILS # BLD AUTO: 29 CELLS/UL (ref 0–200)
BASOPHILS NFR BLD AUTO: 0.7 %
BILIRUB SERPL-MCNC: 0.5 MG/DL (ref 0.2–1.2)
BILIRUB UR QL STRIP: NEGATIVE
BUN SERPL-MCNC: 10 MG/DL (ref 7–25)
BUN/CREAT SERPL: ABNORMAL (CALC) (ref 6–22)
CALCIUM SERPL-MCNC: 9.2 MG/DL (ref 8.6–10.2)
CHLORIDE SERPL-SCNC: 106 MMOL/L (ref 98–110)
CHOLEST SERPL-MCNC: 216 MG/DL
CHOLEST/HDLC SERPL: 3.6 (CALC)
CO2 SERPL-SCNC: 30 MMOL/L (ref 20–32)
COLOR UR: YELLOW
CREAT SERPL-MCNC: 0.74 MG/DL (ref 0.5–1.1)
EOSINOPHIL # BLD AUTO: 49 CELLS/UL (ref 15–500)
EOSINOPHIL NFR BLD AUTO: 1.2 %
ERYTHROCYTE [DISTWIDTH] IN BLOOD BY AUTOMATED COUNT: 12.8 % (ref 11–15)
GFRSERPLBLD MDRD-ARVRAT: 99 ML/MIN/1.73M2
GLOBULIN SER CALC-MCNC: 2.3 G/DL (CALC) (ref 1.9–3.7)
GLUCOSE SERPL-MCNC: 101 MG/DL (ref 65–99)
GLUCOSE UR QL STRIP: NEGATIVE
HCT VFR BLD AUTO: 39.6 % (ref 35–45)
HDLC SERPL-MCNC: 60 MG/DL
HGB BLD-MCNC: 13.4 G/DL (ref 11.7–15.5)
HGB UR QL STRIP: NEGATIVE
KETONES UR QL STRIP: NEGATIVE
LDLC SERPL CALC-MCNC: 139 MG/DL (CALC)
LEUKOCYTE ESTERASE UR QL STRIP: NEGATIVE
LYMPHOCYTES # BLD AUTO: 1214 CELLS/UL (ref 850–3900)
LYMPHOCYTES NFR BLD AUTO: 29.6 %
MCH RBC QN AUTO: 31.1 PG (ref 27–33)
MCHC RBC AUTO-ENTMCNC: 33.8 G/DL (ref 32–36)
MCV RBC AUTO: 91.9 FL (ref 80–100)
MONOCYTES # BLD AUTO: 209 CELLS/UL (ref 200–950)
MONOCYTES NFR BLD AUTO: 5.1 %
NEUTROPHILS # BLD AUTO: 2599 CELLS/UL (ref 1500–7800)
NEUTROPHILS NFR BLD AUTO: 63.4 %
NITRITE UR QL STRIP: NEGATIVE
NONHDLC SERPL-MCNC: 156 MG/DL (CALC)
PH UR STRIP: 7 [PH] (ref 5–8)
PLATELET # BLD AUTO: 305 THOUSAND/UL (ref 140–400)
PMV BLD REES-ECKER: 9.2 FL (ref 7.5–12.5)
POTASSIUM SERPL-SCNC: 4.2 MMOL/L (ref 3.5–5.3)
PROT SERPL-MCNC: 6.4 G/DL (ref 6.1–8.1)
PROT UR QL STRIP: NEGATIVE
RBC # BLD AUTO: 4.31 MILLION/UL (ref 3.8–5.1)
SODIUM SERPL-SCNC: 141 MMOL/L (ref 135–146)
SP GR UR STRIP: 1 (ref 1–1.03)
TRIGL SERPL-MCNC: 73 MG/DL
TSH SERPL-ACNC: 1.31 MIU/L
WBC # BLD AUTO: 4.1 THOUSAND/UL (ref 3.8–10.8)

## 2021-03-15 ENCOUNTER — PATIENT MESSAGE (OUTPATIENT)
Dept: FAMILY MEDICINE | Facility: CLINIC | Age: 46
End: 2021-03-15

## 2021-03-15 DIAGNOSIS — F41.9 ANXIETY: ICD-10-CM

## 2021-03-15 RX ORDER — ALPRAZOLAM 0.5 MG/1
0.5 TABLET ORAL 3 TIMES DAILY
Qty: 270 TABLET | Refills: 0 | Status: SHIPPED | OUTPATIENT
Start: 2021-03-15 | End: 2021-06-18 | Stop reason: SDUPTHER

## 2021-03-23 ENCOUNTER — OFFICE VISIT (OUTPATIENT)
Dept: FAMILY MEDICINE | Facility: CLINIC | Age: 46
End: 2021-03-23
Payer: COMMERCIAL

## 2021-03-23 VITALS
HEIGHT: 66 IN | DIASTOLIC BLOOD PRESSURE: 72 MMHG | HEART RATE: 80 BPM | WEIGHT: 188 LBS | BODY MASS INDEX: 30.22 KG/M2 | SYSTOLIC BLOOD PRESSURE: 102 MMHG

## 2021-03-23 DIAGNOSIS — R06.02 SOB (SHORTNESS OF BREATH): ICD-10-CM

## 2021-03-23 DIAGNOSIS — J30.9 ALLERGIC RHINITIS, UNSPECIFIED SEASONALITY, UNSPECIFIED TRIGGER: ICD-10-CM

## 2021-03-23 DIAGNOSIS — M54.2 CERVICALGIA: ICD-10-CM

## 2021-03-23 DIAGNOSIS — F41.9 ANXIETY: Primary | ICD-10-CM

## 2021-03-23 DIAGNOSIS — E66.9 CLASS 1 OBESITY WITH BODY MASS INDEX (BMI) OF 30.0 TO 30.9 IN ADULT, UNSPECIFIED OBESITY TYPE, UNSPECIFIED WHETHER SERIOUS COMORBIDITY PRESENT: ICD-10-CM

## 2021-03-23 DIAGNOSIS — F17.200 SMOKER: ICD-10-CM

## 2021-03-23 DIAGNOSIS — E78.2 MIXED HYPERLIPIDEMIA: ICD-10-CM

## 2021-03-23 PROCEDURE — 3008F PR BODY MASS INDEX (BMI) DOCUMENTED: ICD-10-PCS | Mod: S$GLB,,, | Performed by: FAMILY MEDICINE

## 2021-03-23 PROCEDURE — 3008F BODY MASS INDEX DOCD: CPT | Mod: S$GLB,,, | Performed by: FAMILY MEDICINE

## 2021-03-23 PROCEDURE — 99214 OFFICE O/P EST MOD 30 MIN: CPT | Mod: S$GLB,,, | Performed by: FAMILY MEDICINE

## 2021-03-23 PROCEDURE — 99214 PR OFFICE/OUTPT VISIT, EST, LEVL IV, 30-39 MIN: ICD-10-PCS | Mod: S$GLB,,, | Performed by: FAMILY MEDICINE

## 2021-03-25 LAB
1OH-MIDAZOLAM UR-MCNC: NEGATIVE NG/ML
7AMINOCLONAZEPAM UR-MCNC: NEGATIVE NG/ML
A-OH ALPRAZ UR-MCNC: 134 NG/ML
A-OH-TRIAZOLAM UR-MCNC: NEGATIVE NG/ML
AMPHETAMINES UR QL: NEGATIVE NG/ML
BARBITURATES UR QL: NEGATIVE NG/ML
BENZODIAZ UR QL: POSITIVE NG/ML
BZE UR QL: NEGATIVE NG/ML
COMMENT: ABNORMAL
CREAT UR-MCNC: 50.7 MG/DL
DRUG SCREEN COMMENT UR-IMP: ABNORMAL
LORAZEPAM UR-MCNC: NEGATIVE NG/ML
METHADONE UR QL: NEGATIVE NG/ML
NORDIAZEPAM UR-MCNC: NEGATIVE NG/ML
OH-ETHYLFLURAZ UR-MCNC: NEGATIVE NG/ML
OPIATES UR QL: NEGATIVE NG/ML
OXAZEPAM UR-MCNC: NEGATIVE NG/ML
OXIDANTS UR QL: NEGATIVE MCG/ML
OXYCODONE UR QL: NEGATIVE NG/ML
PCP UR QL: NEGATIVE NG/ML
PH UR: 5.8 [PH] (ref 4.5–9)
TEMAZEPAM UR-MCNC: NEGATIVE NG/ML

## 2021-05-06 ENCOUNTER — PATIENT MESSAGE (OUTPATIENT)
Dept: RESEARCH | Facility: HOSPITAL | Age: 46
End: 2021-05-06

## 2021-05-06 DIAGNOSIS — G89.29 CHRONIC RIGHT-SIDED LOW BACK PAIN WITHOUT SCIATICA: ICD-10-CM

## 2021-05-06 DIAGNOSIS — J30.9 ALLERGIC RHINITIS, UNSPECIFIED SEASONALITY, UNSPECIFIED TRIGGER: ICD-10-CM

## 2021-05-06 DIAGNOSIS — M54.50 CHRONIC RIGHT-SIDED LOW BACK PAIN WITHOUT SCIATICA: ICD-10-CM

## 2021-05-06 RX ORDER — METHOCARBAMOL 750 MG/1
750 TABLET, FILM COATED ORAL
Qty: 90 TABLET | Refills: 1 | OUTPATIENT
Start: 2021-05-06 | End: 2021-11-02

## 2021-05-06 RX ORDER — FLUTICASONE PROPIONATE 50 MCG
1 SPRAY, SUSPENSION (ML) NASAL DAILY
Qty: 18.2 ML | Refills: 3 | Status: SHIPPED | OUTPATIENT
Start: 2021-05-06 | End: 2022-02-17 | Stop reason: SDUPTHER

## 2021-06-18 ENCOUNTER — PATIENT MESSAGE (OUTPATIENT)
Dept: FAMILY MEDICINE | Facility: CLINIC | Age: 46
End: 2021-06-18

## 2021-06-18 DIAGNOSIS — F41.9 ANXIETY: ICD-10-CM

## 2021-06-18 RX ORDER — ALPRAZOLAM 0.5 MG/1
0.5 TABLET ORAL 3 TIMES DAILY
Qty: 270 TABLET | Refills: 0 | Status: SHIPPED | OUTPATIENT
Start: 2021-06-18 | End: 2021-09-24 | Stop reason: SDUPTHER

## 2021-06-28 ENCOUNTER — HOSPITAL ENCOUNTER (EMERGENCY)
Facility: HOSPITAL | Age: 46
Discharge: HOME | End: 2021-06-28
Attending: EMERGENCY MEDICINE
Payer: COMMERCIAL

## 2021-06-28 VITALS
OXYGEN SATURATION: 97 % | BODY MASS INDEX: 29.73 KG/M2 | TEMPERATURE: 99 F | WEIGHT: 185 LBS | DIASTOLIC BLOOD PRESSURE: 90 MMHG | HEART RATE: 82 BPM | RESPIRATION RATE: 18 BRPM | SYSTOLIC BLOOD PRESSURE: 137 MMHG | HEIGHT: 66 IN
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
## 2021-06-28 DIAGNOSIS — S06.0X9A CONCUSSION WITH LOSS OF CONSCIOUSNESS, INITIAL ENCOUNTER: ICD-10-CM

## 2021-06-28 DIAGNOSIS — S40.012A CONTUSION OF LEFT SHOULDER, INITIAL ENCOUNTER: ICD-10-CM

## 2021-06-28 DIAGNOSIS — T07.XXXA MULTIPLE ABRASIONS: ICD-10-CM

## 2021-06-28 DIAGNOSIS — S80.00XA CONTUSION OF KNEE, UNSPECIFIED LATERALITY, INITIAL ENCOUNTER: ICD-10-CM

## 2021-06-28 DIAGNOSIS — S29.9XXA TRAUMATIC INJURY OF RIB: ICD-10-CM

## 2021-06-28 LAB
BILIRUB UR QL STRIP: NEGATIVE
CLARITY UR: CLEAR
COLOR UR: COLORLESS
GLUCOSE UR QL STRIP: NEGATIVE
HGB UR QL STRIP: NEGATIVE
KETONES UR QL STRIP: NEGATIVE
LEUKOCYTE ESTERASE UR QL STRIP: NEGATIVE
NITRITE UR QL STRIP: NEGATIVE
PH UR STRIP: 7 [PH] (ref 5–8)
PROT UR QL STRIP: NEGATIVE
SP GR UR STRIP: 1 (ref 1–1.03)
URN SPEC COLLECT METH UR: ABNORMAL
UROBILINOGEN UR STRIP-ACNC: NEGATIVE EU/DL

## 2021-06-28 PROCEDURE — 25000003 PHARM REV CODE 250: Performed by: EMERGENCY MEDICINE

## 2021-06-28 PROCEDURE — 63600175 PHARM REV CODE 636 W HCPCS: Performed by: EMERGENCY MEDICINE

## 2021-06-28 PROCEDURE — 90471 IMMUNIZATION ADMIN: CPT | Performed by: EMERGENCY MEDICINE

## 2021-06-28 PROCEDURE — 99285 EMERGENCY DEPT VISIT HI MDM: CPT | Mod: 25

## 2021-06-28 PROCEDURE — 96372 THER/PROPH/DIAG INJ SC/IM: CPT

## 2021-06-28 PROCEDURE — 90715 TDAP VACCINE 7 YRS/> IM: CPT | Performed by: EMERGENCY MEDICINE

## 2021-06-28 PROCEDURE — 81003 URINALYSIS AUTO W/O SCOPE: CPT | Performed by: EMERGENCY MEDICINE

## 2021-06-28 RX ORDER — NALBUPHINE HYDROCHLORIDE 10 MG/ML
10 INJECTION, SOLUTION INTRAMUSCULAR; INTRAVENOUS; SUBCUTANEOUS ONCE
Status: COMPLETED | OUTPATIENT
Start: 2021-06-28 | End: 2021-06-28

## 2021-06-28 RX ORDER — ONDANSETRON 4 MG/1
4 TABLET, ORALLY DISINTEGRATING ORAL
Status: COMPLETED | OUTPATIENT
Start: 2021-06-28 | End: 2021-06-28

## 2021-06-28 RX ORDER — METHOCARBAMOL 500 MG/1
1000 TABLET, FILM COATED ORAL 3 TIMES DAILY
Qty: 30 TABLET | Refills: 0 | Status: SHIPPED | OUTPATIENT
Start: 2021-06-28 | End: 2021-07-03

## 2021-06-28 RX ADMIN — CLOSTRIDIUM TETANI TOXOID ANTIGEN (FORMALDEHYDE INACTIVATED), CORYNEBACTERIUM DIPHTHERIAE TOXOID ANTIGEN (FORMALDEHYDE INACTIVATED), BORDETELLA PERTUSSIS TOXOID ANTIGEN (GLUTARALDEHYDE INACTIVATED), BORDETELLA PERTUSSIS FILAMENTOUS HEMAGGLUTININ ANTIGEN (FORMALDEHYDE INACTIVATED), BORDETELLA PERTUSSIS PERTACTIN ANTIGEN, AND BORDETELLA PERTUSSIS FIMBRIAE 2/3 ANTIGEN 0.5 ML: 5; 2; 2.5; 5; 3; 5 INJECTION, SUSPENSION INTRAMUSCULAR at 12:06

## 2021-06-28 RX ADMIN — ONDANSETRON 4 MG: 4 TABLET, ORALLY DISINTEGRATING ORAL at 02:06

## 2021-06-28 RX ADMIN — NALBUPHINE HYDROCHLORIDE 10 MG: 10 INJECTION, SOLUTION INTRAMUSCULAR; INTRAVENOUS; SUBCUTANEOUS at 02:06

## 2021-07-21 ENCOUNTER — OFFICE VISIT (OUTPATIENT)
Dept: FAMILY MEDICINE | Facility: CLINIC | Age: 46
End: 2021-07-21
Payer: COMMERCIAL

## 2021-07-21 VITALS
BODY MASS INDEX: 31.63 KG/M2 | WEIGHT: 196.81 LBS | HEIGHT: 66 IN | SYSTOLIC BLOOD PRESSURE: 124 MMHG | HEART RATE: 84 BPM | DIASTOLIC BLOOD PRESSURE: 74 MMHG

## 2021-07-21 DIAGNOSIS — F41.9 ANXIETY: ICD-10-CM

## 2021-07-21 DIAGNOSIS — S20.212D CONTUSION OF RIB ON LEFT SIDE, SUBSEQUENT ENCOUNTER: ICD-10-CM

## 2021-07-21 DIAGNOSIS — S80.02XD CONTUSION OF LEFT KNEE, SUBSEQUENT ENCOUNTER: ICD-10-CM

## 2021-07-21 DIAGNOSIS — S06.0X1D CONCUSSION WITH LOSS OF CONSCIOUSNESS OF 30 MINUTES OR LESS, SUBSEQUENT ENCOUNTER: ICD-10-CM

## 2021-07-21 DIAGNOSIS — J30.9 ALLERGIC RHINITIS, UNSPECIFIED SEASONALITY, UNSPECIFIED TRIGGER: Primary | ICD-10-CM

## 2021-07-21 DIAGNOSIS — F17.200 SMOKER: ICD-10-CM

## 2021-07-21 PROCEDURE — 99214 PR OFFICE/OUTPT VISIT, EST, LEVL IV, 30-39 MIN: ICD-10-PCS | Mod: S$GLB,,, | Performed by: FAMILY MEDICINE

## 2021-07-21 PROCEDURE — 99214 OFFICE O/P EST MOD 30 MIN: CPT | Mod: S$GLB,,, | Performed by: FAMILY MEDICINE

## 2021-07-21 PROCEDURE — 3008F PR BODY MASS INDEX (BMI) DOCUMENTED: ICD-10-PCS | Mod: S$GLB,,, | Performed by: FAMILY MEDICINE

## 2021-07-21 PROCEDURE — 3008F BODY MASS INDEX DOCD: CPT | Mod: S$GLB,,, | Performed by: FAMILY MEDICINE

## 2021-07-21 RX ORDER — HYDROCODONE BITARTRATE AND ACETAMINOPHEN 5; 325 MG/1; MG/1
TABLET ORAL
COMMUNITY
Start: 2021-07-13 | End: 2021-11-02

## 2021-09-24 DIAGNOSIS — F41.9 ANXIETY: ICD-10-CM

## 2021-09-24 RX ORDER — ALPRAZOLAM 0.5 MG/1
0.5 TABLET ORAL 3 TIMES DAILY
Qty: 270 TABLET | Refills: 0 | Status: SHIPPED | OUTPATIENT
Start: 2021-09-24 | End: 2021-12-31 | Stop reason: SDUPTHER

## 2021-11-01 ENCOUNTER — TELEPHONE (OUTPATIENT)
Dept: FAMILY MEDICINE | Facility: CLINIC | Age: 46
End: 2021-11-01
Payer: COMMERCIAL

## 2021-11-01 ENCOUNTER — HOSPITAL ENCOUNTER (OUTPATIENT)
Facility: HOSPITAL | Age: 46
Discharge: HOME | End: 2021-11-02
Attending: EMERGENCY MEDICINE | Admitting: INTERNAL MEDICINE
Payer: COMMERCIAL

## 2021-11-01 ENCOUNTER — PATIENT MESSAGE (OUTPATIENT)
Dept: FAMILY MEDICINE | Facility: CLINIC | Age: 46
End: 2021-11-01
Payer: COMMERCIAL

## 2021-11-01 DIAGNOSIS — R42 DIZZINESS: ICD-10-CM

## 2021-11-01 DIAGNOSIS — R20.2 PARESTHESIAS: Primary | ICD-10-CM

## 2021-11-01 DIAGNOSIS — G43.909 MIGRAINE WITHOUT STATUS MIGRAINOSUS, NOT INTRACTABLE, UNSPECIFIED MIGRAINE TYPE: ICD-10-CM

## 2021-11-01 DIAGNOSIS — R51.9 ACUTE NONINTRACTABLE HEADACHE, UNSPECIFIED HEADACHE TYPE: ICD-10-CM

## 2021-11-01 LAB
ALBUMIN SERPL BCP-MCNC: 4.1 G/DL (ref 3.5–5.2)
ALP SERPL-CCNC: 61 U/L (ref 55–135)
ALT SERPL W/O P-5'-P-CCNC: 17 U/L (ref 10–44)
ANION GAP SERPL CALC-SCNC: 11 MMOL/L (ref 8–16)
AST SERPL-CCNC: 18 U/L (ref 10–40)
BASOPHILS # BLD AUTO: 0.03 K/UL (ref 0–0.2)
BASOPHILS NFR BLD: 0.4 % (ref 0–1.9)
BILIRUB SERPL-MCNC: 0.7 MG/DL (ref 0.1–1)
BUN SERPL-MCNC: 7 MG/DL (ref 6–20)
CALCIUM SERPL-MCNC: 9 MG/DL (ref 8.7–10.5)
CHLORIDE SERPL-SCNC: 108 MMOL/L (ref 95–110)
CO2 SERPL-SCNC: 25 MMOL/L (ref 23–29)
CREAT SERPL-MCNC: 0.8 MG/DL (ref 0.5–1.4)
DIFFERENTIAL METHOD: ABNORMAL
EOSINOPHIL # BLD AUTO: 0 K/UL (ref 0–0.5)
EOSINOPHIL NFR BLD: 0.6 % (ref 0–8)
ERYTHROCYTE [DISTWIDTH] IN BLOOD BY AUTOMATED COUNT: 13.2 % (ref 11.5–14.5)
EST. GFR  (AFRICAN AMERICAN): >60 ML/MIN/1.73 M^2
EST. GFR  (NON AFRICAN AMERICAN): >60 ML/MIN/1.73 M^2
GLUCOSE SERPL-MCNC: 95 MG/DL (ref 70–110)
HCT VFR BLD AUTO: 41.7 % (ref 37–48.5)
HGB BLD-MCNC: 13.9 G/DL (ref 12–16)
IMM GRANULOCYTES # BLD AUTO: 0.01 K/UL (ref 0–0.04)
IMM GRANULOCYTES NFR BLD AUTO: 0.1 % (ref 0–0.5)
LYMPHOCYTES # BLD AUTO: 2.5 K/UL (ref 1–4.8)
LYMPHOCYTES NFR BLD: 36.9 % (ref 18–48)
MCH RBC QN AUTO: 30.9 PG (ref 27–31)
MCHC RBC AUTO-ENTMCNC: 33.3 G/DL (ref 32–36)
MCV RBC AUTO: 93 FL (ref 82–98)
MONOCYTES # BLD AUTO: 0.3 K/UL (ref 0.3–1)
MONOCYTES NFR BLD: 4.4 % (ref 4–15)
NEUTROPHILS # BLD AUTO: 3.9 K/UL (ref 1.8–7.7)
NEUTROPHILS NFR BLD: 57.6 % (ref 38–73)
NRBC BLD-RTO: 0 /100 WBC
PLATELET # BLD AUTO: 314 K/UL (ref 150–450)
PMV BLD AUTO: 8.9 FL (ref 9.2–12.9)
POTASSIUM SERPL-SCNC: 3.8 MMOL/L (ref 3.5–5.1)
PROT SERPL-MCNC: 7.1 G/DL (ref 6–8.4)
RBC # BLD AUTO: 4.5 M/UL (ref 4–5.4)
SODIUM SERPL-SCNC: 144 MMOL/L (ref 136–145)
TSH SERPL DL<=0.005 MIU/L-ACNC: 3.08 UIU/ML (ref 0.34–5.6)
WBC # BLD AUTO: 6.83 K/UL (ref 3.9–12.7)

## 2021-11-01 PROCEDURE — 85025 COMPLETE CBC W/AUTO DIFF WBC: CPT | Performed by: PHYSICIAN ASSISTANT

## 2021-11-01 PROCEDURE — 96374 THER/PROPH/DIAG INJ IV PUSH: CPT

## 2021-11-01 PROCEDURE — 63600175 PHARM REV CODE 636 W HCPCS: Performed by: EMERGENCY MEDICINE

## 2021-11-01 PROCEDURE — 80053 COMPREHEN METABOLIC PANEL: CPT | Performed by: PHYSICIAN ASSISTANT

## 2021-11-01 PROCEDURE — 36415 COLL VENOUS BLD VENIPUNCTURE: CPT | Performed by: PHYSICIAN ASSISTANT

## 2021-11-01 PROCEDURE — 84443 ASSAY THYROID STIM HORMONE: CPT | Performed by: PHYSICIAN ASSISTANT

## 2021-11-01 PROCEDURE — 99285 EMERGENCY DEPT VISIT HI MDM: CPT | Mod: 25

## 2021-11-01 PROCEDURE — 25000003 PHARM REV CODE 250: Performed by: EMERGENCY MEDICINE

## 2021-11-01 RX ORDER — PROCHLORPERAZINE EDISYLATE 5 MG/ML
10 INJECTION INTRAMUSCULAR; INTRAVENOUS
Status: COMPLETED | OUTPATIENT
Start: 2021-11-01 | End: 2021-11-01

## 2021-11-01 RX ORDER — MECLIZINE HCL 12.5 MG 12.5 MG/1
50 TABLET ORAL
Status: COMPLETED | OUTPATIENT
Start: 2021-11-01 | End: 2021-11-01

## 2021-11-01 RX ADMIN — MECLIZINE HYDROCHLORIDE 50 MG: 12.5 TABLET ORAL at 10:11

## 2021-11-01 RX ADMIN — PROCHLORPERAZINE EDISYLATE 10 MG: 5 INJECTION INTRAMUSCULAR; INTRAVENOUS at 11:11

## 2021-11-02 VITALS
HEIGHT: 66 IN | DIASTOLIC BLOOD PRESSURE: 55 MMHG | HEART RATE: 89 BPM | SYSTOLIC BLOOD PRESSURE: 98 MMHG | OXYGEN SATURATION: 96 % | WEIGHT: 200 LBS | TEMPERATURE: 98 F | RESPIRATION RATE: 24 BRPM | BODY MASS INDEX: 32.14 KG/M2

## 2021-11-02 PROBLEM — E66.9 OBESITY: Status: ACTIVE | Noted: 2021-11-02

## 2021-11-02 PROBLEM — G43.909 MIGRAINE: Status: ACTIVE | Noted: 2021-11-02

## 2021-11-02 PROBLEM — G45.9 TIA (TRANSIENT ISCHEMIC ATTACK): Status: RESOLVED | Noted: 2021-11-02 | Resolved: 2021-11-02

## 2021-11-02 PROBLEM — H81.10 BPPV (BENIGN PAROXYSMAL POSITIONAL VERTIGO): Status: ACTIVE | Noted: 2021-11-02

## 2021-11-02 PROBLEM — F41.9 ANXIETY: Status: ACTIVE | Noted: 2021-11-02

## 2021-11-02 PROBLEM — G45.9 TIA (TRANSIENT ISCHEMIC ATTACK): Status: ACTIVE | Noted: 2021-11-02

## 2021-11-02 LAB
ALBUMIN SERPL BCP-MCNC: 3.7 G/DL (ref 3.5–5.2)
ALP SERPL-CCNC: 55 U/L (ref 55–135)
ALT SERPL W/O P-5'-P-CCNC: 16 U/L (ref 10–44)
AMPHET+METHAMPHET UR QL: NEGATIVE
ANION GAP SERPL CALC-SCNC: 9 MMOL/L (ref 8–16)
AST SERPL-CCNC: 17 U/L (ref 10–40)
BARBITURATES UR QL SCN>200 NG/ML: NEGATIVE
BENZODIAZ UR QL SCN>200 NG/ML: ABNORMAL
BILIRUB SERPL-MCNC: 0.7 MG/DL (ref 0.1–1)
BUN SERPL-MCNC: 8 MG/DL (ref 6–20)
BZE UR QL SCN: NEGATIVE
CALCIUM SERPL-MCNC: 8.9 MG/DL (ref 8.7–10.5)
CANNABINOIDS UR QL SCN: ABNORMAL
CHLORIDE SERPL-SCNC: 110 MMOL/L (ref 95–110)
CO2 SERPL-SCNC: 25 MMOL/L (ref 23–29)
CREAT SERPL-MCNC: 0.8 MG/DL (ref 0.5–1.4)
CREAT UR-MCNC: 54 MG/DL (ref 15–325)
ERYTHROCYTE [DISTWIDTH] IN BLOOD BY AUTOMATED COUNT: 13.1 % (ref 11.5–14.5)
EST. GFR  (AFRICAN AMERICAN): >60 ML/MIN/1.73 M^2
EST. GFR  (NON AFRICAN AMERICAN): >60 ML/MIN/1.73 M^2
GLUCOSE SERPL-MCNC: 110 MG/DL (ref 70–110)
HCT VFR BLD AUTO: 40 % (ref 37–48.5)
HGB BLD-MCNC: 13.3 G/DL (ref 12–16)
MCH RBC QN AUTO: 30.6 PG (ref 27–31)
MCHC RBC AUTO-ENTMCNC: 33.3 G/DL (ref 32–36)
MCV RBC AUTO: 92 FL (ref 82–98)
OPIATES UR QL SCN: NEGATIVE
PCP UR QL SCN>25 NG/ML: NEGATIVE
PLATELET # BLD AUTO: 288 K/UL (ref 150–450)
PMV BLD AUTO: 8.7 FL (ref 9.2–12.9)
POTASSIUM SERPL-SCNC: 3.8 MMOL/L (ref 3.5–5.1)
PROT SERPL-MCNC: 6.5 G/DL (ref 6–8.4)
RBC # BLD AUTO: 4.34 M/UL (ref 4–5.4)
SARS-COV-2 RDRP RESP QL NAA+PROBE: NEGATIVE
SODIUM SERPL-SCNC: 144 MMOL/L (ref 136–145)
TOXICOLOGY INFORMATION: ABNORMAL
WBC # BLD AUTO: 5.11 K/UL (ref 3.9–12.7)

## 2021-11-02 PROCEDURE — 25000003 PHARM REV CODE 250: Performed by: INTERNAL MEDICINE

## 2021-11-02 PROCEDURE — G0378 HOSPITAL OBSERVATION PER HR: HCPCS

## 2021-11-02 PROCEDURE — U0002 COVID-19 LAB TEST NON-CDC: HCPCS | Performed by: EMERGENCY MEDICINE

## 2021-11-02 PROCEDURE — 85027 COMPLETE CBC AUTOMATED: CPT | Performed by: INTERNAL MEDICINE

## 2021-11-02 PROCEDURE — 80053 COMPREHEN METABOLIC PANEL: CPT | Performed by: INTERNAL MEDICINE

## 2021-11-02 PROCEDURE — 80307 DRUG TEST PRSMV CHEM ANLYZR: CPT | Performed by: INTERNAL MEDICINE

## 2021-11-02 RX ORDER — FAMOTIDINE 20 MG/1
20 TABLET, FILM COATED ORAL 2 TIMES DAILY
Status: DISCONTINUED | OUTPATIENT
Start: 2021-11-02 | End: 2021-11-02 | Stop reason: HOSPADM

## 2021-11-02 RX ORDER — ALPRAZOLAM 0.5 MG/1
0.5 TABLET ORAL 3 TIMES DAILY
Status: DISCONTINUED | OUTPATIENT
Start: 2021-11-02 | End: 2021-11-02 | Stop reason: HOSPADM

## 2021-11-02 RX ORDER — MECLIZINE HCL 12.5 MG 12.5 MG/1
25 TABLET ORAL 3 TIMES DAILY
Status: DISCONTINUED | OUTPATIENT
Start: 2021-11-02 | End: 2021-11-02 | Stop reason: HOSPADM

## 2021-11-02 RX ORDER — MECLIZINE HYDROCHLORIDE 25 MG/1
25 TABLET ORAL 3 TIMES DAILY PRN
Qty: 90 TABLET | Refills: 0 | Status: SHIPPED | OUTPATIENT
Start: 2021-11-02 | End: 2023-12-11

## 2021-11-02 RX ORDER — FAMOTIDINE 20 MG/1
20 TABLET, FILM COATED ORAL 2 TIMES DAILY
Qty: 60 TABLET | Refills: 0 | Status: SHIPPED | OUTPATIENT
Start: 2021-11-02 | End: 2023-08-09

## 2021-11-02 RX ORDER — TOPIRAMATE 25 MG/1
25 TABLET ORAL NIGHTLY
Qty: 30 TABLET | Refills: 0 | Status: SHIPPED | OUTPATIENT
Start: 2021-11-02 | End: 2022-09-07

## 2021-11-02 RX ADMIN — MECLIZINE HYDROCHLORIDE 25 MG: 12.5 TABLET ORAL at 05:11

## 2021-11-02 RX ADMIN — FAMOTIDINE 20 MG: 20 TABLET ORAL at 08:11

## 2021-11-02 RX ADMIN — ALPRAZOLAM 0.5 MG: 0.5 TABLET ORAL at 08:11

## 2021-11-03 ENCOUNTER — TELEPHONE (OUTPATIENT)
Dept: FAMILY MEDICINE | Facility: CLINIC | Age: 46
End: 2021-11-03
Payer: COMMERCIAL

## 2021-11-04 ENCOUNTER — PATIENT OUTREACH (OUTPATIENT)
Dept: FAMILY MEDICINE | Facility: CLINIC | Age: 46
End: 2021-11-04
Payer: COMMERCIAL

## 2021-11-17 ENCOUNTER — OFFICE VISIT (OUTPATIENT)
Dept: FAMILY MEDICINE | Facility: CLINIC | Age: 46
End: 2021-11-17
Payer: COMMERCIAL

## 2021-11-17 VITALS
TEMPERATURE: 99 F | DIASTOLIC BLOOD PRESSURE: 74 MMHG | BODY MASS INDEX: 31.98 KG/M2 | SYSTOLIC BLOOD PRESSURE: 108 MMHG | WEIGHT: 199 LBS | HEART RATE: 100 BPM | HEIGHT: 66 IN

## 2021-11-17 DIAGNOSIS — F41.9 ANXIETY: ICD-10-CM

## 2021-11-17 DIAGNOSIS — Z09 HOSPITAL DISCHARGE FOLLOW-UP: Primary | ICD-10-CM

## 2021-11-17 DIAGNOSIS — F33.9 RECURRENT MAJOR DEPRESSIVE DISORDER, REMISSION STATUS UNSPECIFIED: ICD-10-CM

## 2021-11-17 DIAGNOSIS — R42 DIZZINESS: Primary | ICD-10-CM

## 2021-11-17 DIAGNOSIS — G43.709 CHRONIC MIGRAINE WITHOUT AURA WITHOUT STATUS MIGRAINOSUS, NOT INTRACTABLE: ICD-10-CM

## 2021-11-17 DIAGNOSIS — F17.200 SMOKER: ICD-10-CM

## 2021-11-17 PROCEDURE — 99214 OFFICE O/P EST MOD 30 MIN: CPT | Mod: S$GLB,,, | Performed by: FAMILY MEDICINE

## 2021-11-17 PROCEDURE — 99214 PR OFFICE/OUTPT VISIT, EST, LEVL IV, 30-39 MIN: ICD-10-PCS | Mod: S$GLB,,, | Performed by: FAMILY MEDICINE

## 2021-11-17 RX ORDER — ALBUTEROL SULFATE 90 UG/1
AEROSOL, METERED RESPIRATORY (INHALATION)
COMMUNITY
Start: 2021-11-16

## 2021-11-17 RX ORDER — CETIRIZINE HYDROCHLORIDE 10 MG/1
TABLET ORAL
COMMUNITY
Start: 2021-11-16

## 2021-11-17 RX ORDER — LEVOFLOXACIN 500 MG/1
TABLET, FILM COATED ORAL
COMMUNITY
Start: 2021-11-16 | End: 2022-02-17

## 2021-11-17 RX ORDER — VENLAFAXINE HYDROCHLORIDE 75 MG/1
75 CAPSULE, EXTENDED RELEASE ORAL DAILY
Qty: 30 CAPSULE | Refills: 0 | Status: SHIPPED | OUTPATIENT
Start: 2021-11-17 | End: 2021-12-16 | Stop reason: SDUPTHER

## 2021-11-17 RX ORDER — METHYLPREDNISOLONE 4 MG/1
TABLET ORAL
COMMUNITY
Start: 2021-11-16 | End: 2022-02-17

## 2021-11-17 RX ORDER — PROMETHAZINE HYDROCHLORIDE AND DEXTROMETHORPHAN HYDROBROMIDE 6.25; 15 MG/5ML; MG/5ML
SYRUP ORAL
COMMUNITY
Start: 2021-10-04 | End: 2022-02-17

## 2021-11-17 RX ORDER — AZELASTINE 1 MG/ML
SPRAY, METERED NASAL
COMMUNITY
Start: 2021-10-04

## 2021-11-29 ENCOUNTER — HOSPITAL ENCOUNTER (OUTPATIENT)
Dept: RADIOLOGY | Facility: HOSPITAL | Age: 46
Discharge: HOME | End: 2021-11-29
Attending: NURSE PRACTITIONER
Payer: COMMERCIAL

## 2021-11-29 DIAGNOSIS — R42 DIZZINESS: ICD-10-CM

## 2021-11-29 PROCEDURE — 93880 EXTRACRANIAL BILAT STUDY: CPT | Mod: TC,PO

## 2021-12-13 ENCOUNTER — OFFICE VISIT (OUTPATIENT)
Dept: OPHTHALMOLOGY | Facility: CLINIC | Age: 46
End: 2021-12-13
Payer: COMMERCIAL

## 2021-12-13 DIAGNOSIS — G43.009 MIGRAINE WITHOUT AURA AND WITHOUT STATUS MIGRAINOSUS, NOT INTRACTABLE: Primary | ICD-10-CM

## 2021-12-13 PROCEDURE — 99202 OFFICE O/P NEW SF 15 MIN: CPT | Mod: S$GLB,,, | Performed by: OPHTHALMOLOGY

## 2021-12-13 PROCEDURE — 99202 PR OFFICE/OUTPT VISIT, NEW, LEVL II, 15-29 MIN: ICD-10-PCS | Mod: S$GLB,,, | Performed by: OPHTHALMOLOGY

## 2021-12-13 PROCEDURE — 99999 PR PBB SHADOW E&M-EST. PATIENT-LVL III: ICD-10-PCS | Mod: PBBFAC,,, | Performed by: OPHTHALMOLOGY

## 2021-12-13 PROCEDURE — 99999 PR PBB SHADOW E&M-EST. PATIENT-LVL III: CPT | Mod: PBBFAC,,, | Performed by: OPHTHALMOLOGY

## 2021-12-13 RX ORDER — UBROGEPANT 50 MG/1
TABLET ORAL
COMMUNITY
Start: 2021-12-03 | End: 2022-09-07 | Stop reason: SDUPTHER

## 2021-12-16 DIAGNOSIS — R20.2 PARESTHESIA: Primary | ICD-10-CM

## 2021-12-16 DIAGNOSIS — F33.9 RECURRENT MAJOR DEPRESSIVE DISORDER, REMISSION STATUS UNSPECIFIED: ICD-10-CM

## 2021-12-16 DIAGNOSIS — M99.81 OTHER BIOMECHANICAL LESIONS OF CERVICAL REGION: ICD-10-CM

## 2021-12-16 RX ORDER — VENLAFAXINE HYDROCHLORIDE 75 MG/1
75 CAPSULE, EXTENDED RELEASE ORAL DAILY
Qty: 90 CAPSULE | Refills: 1 | Status: SHIPPED | OUTPATIENT
Start: 2021-12-16 | End: 2022-01-31

## 2021-12-29 ENCOUNTER — PATIENT MESSAGE (OUTPATIENT)
Dept: FAMILY MEDICINE | Facility: CLINIC | Age: 46
End: 2021-12-29
Payer: COMMERCIAL

## 2021-12-31 DIAGNOSIS — F41.9 ANXIETY: ICD-10-CM

## 2022-01-03 RX ORDER — ALPRAZOLAM 0.5 MG/1
0.5 TABLET ORAL 3 TIMES DAILY
Qty: 270 TABLET | Refills: 0 | Status: SHIPPED | OUTPATIENT
Start: 2022-01-03 | End: 2022-04-27 | Stop reason: SDUPTHER

## 2022-01-04 ENCOUNTER — HOSPITAL ENCOUNTER (OUTPATIENT)
Dept: RADIOLOGY | Facility: HOSPITAL | Age: 47
Discharge: HOME | End: 2022-01-04
Attending: NURSE PRACTITIONER
Payer: COMMERCIAL

## 2022-01-04 DIAGNOSIS — M99.81 OTHER BIOMECHANICAL LESIONS OF CERVICAL REGION: ICD-10-CM

## 2022-01-04 DIAGNOSIS — R20.2 PARESTHESIA: ICD-10-CM

## 2022-01-04 NOTE — TELEPHONE ENCOUNTER
The patient's prescription has been approved and sent to   [facility]/pharmacy #[id number] - [place], [place] - [address]  [address] [address]  Phone: [phone] Fax: [phone]

## 2022-01-27 ENCOUNTER — PATIENT MESSAGE (OUTPATIENT)
Dept: FAMILY MEDICINE | Facility: CLINIC | Age: 47
End: 2022-01-27
Payer: COMMERCIAL

## 2022-01-27 DIAGNOSIS — I89.1 LYMPHANGITIS OF GROIN: Primary | ICD-10-CM

## 2022-01-28 ENCOUNTER — TELEPHONE (OUTPATIENT)
Dept: FAMILY MEDICINE | Facility: CLINIC | Age: 47
End: 2022-01-28
Payer: COMMERCIAL

## 2022-01-28 RX ORDER — AZITHROMYCIN 250 MG/1
TABLET, FILM COATED ORAL
Qty: 6 TABLET | Refills: 0 | Status: SHIPPED | OUTPATIENT
Start: 2022-01-28 | End: 2022-02-02

## 2022-01-28 NOTE — TELEPHONE ENCOUNTER
----- Message from Cecy Dinh sent at 1/28/2022 11:33 AM CST -----  Patient called and stated that she left a message in the portal and she was waiting on a call back she would really like to speak to someone before the office closes please give her a call at [phone]

## 2022-01-31 ENCOUNTER — PATIENT MESSAGE (OUTPATIENT)
Dept: FAMILY MEDICINE | Facility: CLINIC | Age: 47
End: 2022-01-31
Payer: COMMERCIAL

## 2022-01-31 RX ORDER — VENLAFAXINE HYDROCHLORIDE 150 MG/1
150 CAPSULE, EXTENDED RELEASE ORAL DAILY
Qty: 30 CAPSULE | Refills: 5 | Status: SHIPPED | OUTPATIENT
Start: 2022-01-31 | End: 2022-07-27 | Stop reason: SDUPTHER

## 2022-02-01 NOTE — TELEPHONE ENCOUNTER
The patient's prescription has been approved and sent to   [facility]/pharmacy #[id number] - [place], [place] - [address]  [address] [address]  Phone: [phone] Fax: [phone]

## 2022-02-16 NOTE — PROGRESS NOTES
SUBJECTIVE:    Patient ID: Becky Sy is a 46 y.o. female.    Chief Complaint: Follow-up (3mth, pulse still high since having covid on 2/2/2022, Mammo ordered, went over meds verbally// SW)    Pt seen to checkup on acute and chronic conditions.    Pt states she has been sick since November. Has been coughing, aching. Tested positive on 2/2/22 at urgent care.  Has allergies, which are not that bad. Taking flonase.     Effexor is doing well for her sadness. Doesn't feel 'oh woe is me'. Can deal more with her feeling now. Not crying all the time, just occasionally and it feels normal.     Continues to smoke regularly.  Breathing is doing ok under the circumstances.     Weight is stable. Was exercising before she had COVID.  Plans to resume when she feels better.     -------------------------------------------  Mammogram UTD 11/2020            Admission on 11/01/2021, Discharged on 11/02/2021   Component Date Value Ref Range Status    WBC 11/01/2021 6.83  3.90 - 12.70 K/uL Final    RBC 11/01/2021 4.50  4.00 - 5.40 M/uL Final    Hemoglobin 11/01/2021 13.9  12.0 - 16.0 g/dL Final    Hematocrit 11/01/2021 41.7  37.0 - 48.5 % Final    MCV 11/01/2021 93  82 - 98 fL Final    MCH 11/01/2021 30.9  27.0 - 31.0 pg Final    MCHC 11/01/2021 33.3  32.0 - 36.0 g/dL Final    RDW 11/01/2021 13.2  11.5 - 14.5 % Final    Platelets 11/01/2021 314  150 - 450 K/uL Final    MPV 11/01/2021 8.9* 9.2 - 12.9 fL Final    Immature Granulocytes 11/01/2021 0.1  0.0 - 0.5 % Final    Gran # (ANC) 11/01/2021 3.9  1.8 - 7.7 K/uL Final    Immature Grans (Abs) 11/01/2021 0.01  0.00 - 0.04 K/uL Final    Lymph # 11/01/2021 2.5  1.0 - 4.8 K/uL Final    Mono # 11/01/2021 0.3  0.3 - 1.0 K/uL Final    Eos # 11/01/2021 0.0  0.0 - 0.5 K/uL Final    Baso # 11/01/2021 0.03  0.00 - 0.20 K/uL Final    nRBC 11/01/2021 0  0 /100 WBC Final    Gran % 11/01/2021 57.6  38.0 - 73.0 % Final    Lymph % 11/01/2021 36.9  18.0 - 48.0 % Final     Mono % 11/01/2021 4.4  4.0 - 15.0 % Final    Eosinophil % 11/01/2021 0.6  0.0 - 8.0 % Final    Basophil % 11/01/2021 0.4  0.0 - 1.9 % Final    Differential Method 11/01/2021 Automated   Final    Sodium 11/01/2021 144  136 - 145 mmol/L Final    Potassium 11/01/2021 3.8  3.5 - 5.1 mmol/L Final    Chloride 11/01/2021 108  95 - 110 mmol/L Final    CO2 11/01/2021 25  23 - 29 mmol/L Final    Glucose 11/01/2021 95  70 - 110 mg/dL Final    BUN 11/01/2021 7  6 - 20 mg/dL Final    Creatinine 11/01/2021 0.8  0.5 - 1.4 mg/dL Final    Calcium 11/01/2021 9.0  8.7 - 10.5 mg/dL Final    Total Protein 11/01/2021 7.1  6.0 - 8.4 g/dL Final    Albumin 11/01/2021 4.1  3.5 - 5.2 g/dL Final    Total Bilirubin 11/01/2021 0.7  0.1 - 1.0 mg/dL Final    Alkaline Phosphatase 11/01/2021 61  55 - 135 U/L Final    AST 11/01/2021 18  10 - 40 U/L Final    ALT 11/01/2021 17  10 - 44 U/L Final    Anion Gap 11/01/2021 11  8 - 16 mmol/L Final    eGFR if African American 11/01/2021 >60.0  >60 mL/min/1.73 m^2 Final    eGFR if non African American 11/01/2021 >60.0  >60 mL/min/1.73 m^2 Final    TSH 11/01/2021 3.080  0.340 - 5.600 uIU/mL Final    SARS-CoV-2 RNA, Amplification, Qual 11/02/2021 Negative  Negative Final    WBC 11/02/2021 5.11  3.90 - 12.70 K/uL Final    RBC 11/02/2021 4.34  4.00 - 5.40 M/uL Final    Hemoglobin 11/02/2021 13.3  12.0 - 16.0 g/dL Final    Hematocrit 11/02/2021 40.0  37.0 - 48.5 % Final    MCV 11/02/2021 92  82 - 98 fL Final    MCH 11/02/2021 30.6  27.0 - 31.0 pg Final    MCHC 11/02/2021 33.3  32.0 - 36.0 g/dL Final    RDW 11/02/2021 13.1  11.5 - 14.5 % Final    Platelets 11/02/2021 288  150 - 450 K/uL Final    MPV 11/02/2021 8.7* 9.2 - 12.9 fL Final    Sodium 11/02/2021 144  136 - 145 mmol/L Final    Potassium 11/02/2021 3.8  3.5 - 5.1 mmol/L Final    Chloride 11/02/2021 110  95 - 110 mmol/L Final    CO2 11/02/2021 25  23 - 29 mmol/L Final    Glucose 11/02/2021 110  70 - 110 mg/dL Final     BUN 11/02/2021 8  6 - 20 mg/dL Final    Creatinine 11/02/2021 0.8  0.5 - 1.4 mg/dL Final    Calcium 11/02/2021 8.9  8.7 - 10.5 mg/dL Final    Total Protein 11/02/2021 6.5  6.0 - 8.4 g/dL Final    Albumin 11/02/2021 3.7  3.5 - 5.2 g/dL Final    Total Bilirubin 11/02/2021 0.7  0.1 - 1.0 mg/dL Final    Alkaline Phosphatase 11/02/2021 55  55 - 135 U/L Final    AST 11/02/2021 17  10 - 40 U/L Final    ALT 11/02/2021 16  10 - 44 U/L Final    Anion Gap 11/02/2021 9  8 - 16 mmol/L Final    eGFR if African American 11/02/2021 >60.0  >60 mL/min/1.73 m^2 Final    eGFR if non African American 11/02/2021 >60.0  >60 mL/min/1.73 m^2 Final    Benzodiazepines 11/02/2021 Presumptive Positive* Negative Final    Cocaine (Metab.) 11/02/2021 Negative  Negative Final    Opiate Scrn, Ur 11/02/2021 Negative  Negative Final    Barbiturate Screen, Ur 11/02/2021 Negative  Negative Final    Amphetamine Screen, Ur 11/02/2021 Negative  Negative Final    THC 11/02/2021 Presumptive Positive* Negative Final    Phencyclidine 11/02/2021 Negative  Negative Final    Creatinine, Urine 11/02/2021 54.0  15.0 - 325.0 mg/dL Final    Toxicology Information 11/02/2021 SEE COMMENT   Final       Past Medical History:   Diagnosis Date    Allergy     Anxiety     Anxiety 1994    on meds    Arthritis 1990    knees miley, miley thumbs    Depression     Fibroids 09/03/2019    Dr Turpin    Menorrhagia 09/03/2019    Dr Turpin    V-tach 2007    resolved itself     Social History     Socioeconomic History    Marital status:    Tobacco Use    Smoking status: Current Every Day Smoker     Packs/day: 0.50     Years: 20.00     Pack years: 10.00     Types: Cigarettes    Smokeless tobacco: Never Used   Substance and Sexual Activity    Alcohol use: Yes     Comment: Occasionally    Drug use: No    Sexual activity: Yes     Partners: Male     Past Surgical History:   Procedure Laterality Date    ARTHROSCOPIC CHONDROPLASTY OF KNEE JOINT Left 1997     BACK SURGERY  2007    jerrell    BREAST BIOPSY Left 1996    ECHOCARDIOGRAPHY  2007    HYSTERECTOMY      laparascopic  1998    diagnostic to remove endometriosis    LAPAROSCOPY-ASSISTED VAGINAL HYSTERECTOMY N/A 9/17/2019    Procedure: HYSTERECTOMY, VAGINAL, LAPAROSCOPY-ASSISTED;  Surgeon: Isabella Turpin MD;  Location: [facility];  Service: OB/GYN;  Laterality: N/A;    stess test  2007     Family History   Problem Relation Age of Onset    Hypertension Mother        Review of patient's allergies indicates:  No Known Allergies    Current Outpatient Medications:     albuterol (PROVENTIL/VENTOLIN HFA) 90 mcg/actuation inhaler, , Disp: , Rfl:     ALPRAZolam (XANAX) 0.5 MG tablet, Take 1 tablet (0.5 mg total) by mouth 3 (three) times daily., Disp: 270 tablet, Rfl: 0    azelastine (ASTELIN) 137 mcg (0.1 %) nasal spray, SPRAY 1 SPRAY IN EACH NOSTRIL 2 TIMES PER DAY, Disp: , Rfl:     CANNABIDIOL, CBD, EXTRACT ORAL, Place under the tongue once daily., Disp: , Rfl:     cetirizine (ZYRTEC) 10 MG tablet, , Disp: , Rfl:     famotidine (PEPCID) 20 MG tablet, Take 1 tablet (20 mg total) by mouth 2 (two) times daily., Disp: 60 tablet, Rfl: 0    ibuprofen (ADVIL,MOTRIN) 600 MG tablet, Take 1 tablet (600 mg total) by mouth every 6 (six) hours as needed., Disp: , Rfl: 0    meclizine (ANTIVERT) 25 mg tablet, Take 1 tablet (25 mg total) by mouth 3 (three) times daily as needed for Dizziness., Disp: 90 tablet, Rfl: 0    multivitamin capsule, Take 1 capsule by mouth once daily., Disp: , Rfl:     topiramate (TOPAMAX) 25 MG tablet, Take 1 tablet (25 mg total) by mouth every evening., Disp: 30 tablet, Rfl: 0    UBROGEPANT 50 mg tablet, Take by mouth., Disp: , Rfl:     venlafaxine (EFFEXOR-XR) 150 MG Cp24, Take 1 capsule (150 mg total) by mouth once daily., Disp: 30 capsule, Rfl: 5    fluticasone propionate (FLONASE) 50 mcg/actuation nasal spray, 1 spray (50 mcg total) by Each Nostril route once daily., Disp: 18.2 mL, Rfl:  "3    HYDROcodone-acetaminophen (NORCO) 5-325 mg per tablet, Take 1 tablet by mouth every 8 (eight) hours as needed for Pain., Disp: 21 tablet, Rfl: 0    Review of Systems   Constitutional: Negative for appetite change, fatigue, fever and unexpected weight change.   HENT: Negative for congestion, postnasal drip, rhinorrhea and sneezing.    Respiratory: Negative for cough, chest tightness, shortness of breath and wheezing.    Cardiovascular: Negative for chest pain and leg swelling.   Gastrointestinal: Negative for abdominal pain, constipation, nausea and vomiting.        -heartburn   Genitourinary: Negative for difficulty urinating, dysuria, frequency and urgency.   Musculoskeletal: Positive for neck pain. Negative for arthralgias, back pain and myalgias.   Skin: Negative for rash.   Allergic/Immunologic: Positive for environmental allergies.   Neurological: Negative for dizziness, weakness, numbness and headaches.   Hematological: Does not bruise/bleed easily.   Psychiatric/Behavioral: Positive for sleep disturbance. Negative for dysphoric mood and suicidal ideas. The patient is nervous/anxious.    All other systems reviewed and are negative.         Objective:      Vitals:    02/17/22 1058   BP: 100/68   Pulse: 96   Weight: 90.3 kg (199 lb)   Height: 5' 6" (1.676 m)     Wt Readings from Last 3 Encounters:   02/17/22 90.3 kg (199 lb)   11/17/21 90.3 kg (199 lb)   11/02/21 90.7 kg (200 lb)       Physical Exam  Vitals reviewed.   Constitutional:       General: She is not in acute distress.     Appearance: Normal appearance. She is well-developed. She is obese.   HENT:      Head: Normocephalic and atraumatic.   Neck:      Thyroid: No thyromegaly.   Cardiovascular:      Rate and Rhythm: Normal rate and regular rhythm.      Heart sounds: Normal heart sounds. No murmur heard.  No friction rub.   Pulmonary:      Effort: Pulmonary effort is normal.      Breath sounds: Normal breath sounds. No wheezing or rales. "   Abdominal:      General: Bowel sounds are normal. There is no distension.      Palpations: Abdomen is soft.      Tenderness: There is no abdominal tenderness.   Musculoskeletal:      Cervical back: Neck supple. No spinous process tenderness.   Lymphadenopathy:      Cervical: No cervical adenopathy.   Skin:     General: Skin is warm and dry.      Findings: No rash.   Neurological:      Mental Status: She is alert and oriented to person, place, and time.   Psychiatric:         Attention and Perception: She is attentive.         Speech: Speech normal.         Behavior: Behavior normal.         Thought Content: Thought content normal.         Judgment: Judgment normal.           Assessment:       1. Anxiety    2. Allergic rhinitis, unspecified seasonality, unspecified trigger    3. Other screening mammogram    4. Chronic right-sided low back pain without sciatica    5. Amenorrhea    6. History of COVID-19    7. Long-term use of high-risk medication    8. Screening for blood or protein in urine    9. Screening for ischemic heart disease (IHD)    10. Screening for endocrine disorder         Plan:       Anxiety  Comments:  Improved. Will continue to monitor symptoms on effexor.    Allergic rhinitis, unspecified seasonality, unspecified trigger  Comments:  Controlled. To continue flonase. Will continue to monitor symptoms.  Orders:  -     fluticasone propionate (FLONASE) 50 mcg/actuation nasal spray; 1 spray (50 mcg total) by Each Nostril route once daily.  Dispense: 18.2 mL; Refill: 3    Other screening mammogram  Comments:  Mammogram order sent to [facility]  Orders:  -     Mammo Digital Screening Bilat; Future; Expected date: 02/17/2022    Chronic right-sided low back pain without sciatica  Comments:  Intermittent. Pt given short rx for exacerbations.   Orders:  -     HYDROcodone-acetaminophen (NORCO) 5-325 mg per tablet; Take 1 tablet by mouth every 8 (eight) hours as needed for Pain.  Dispense: 21 tablet; Refill:  0    Amenorrhea  -     Follicle Stimulating Hormone; Future; Expected date: 02/17/2022    History of COVID-19    Long-term use of high-risk medication  -     Comprehensive Metabolic Panel; Future; Expected date: 02/17/2022  -     Lipid Panel; Future; Expected date: 02/17/2022  -     Urinalysis, Reflex to Urine Culture Urine, Clean Catch; Future; Expected date: 02/17/2022  -     TSH w/reflex to FT4; Future; Expected date: 02/17/2022  -     CBC Auto Differential; Future; Expected date: 02/17/2022    Screening for blood or protein in urine  -     Urinalysis, Reflex to Urine Culture Urine, Clean Catch; Future; Expected date: 02/17/2022    Screening for ischemic heart disease (IHD)  -     Comprehensive Metabolic Panel; Future; Expected date: 02/17/2022  -     Lipid Panel; Future; Expected date: 02/17/2022    Screening for endocrine disorder  -     TSH w/reflex to FT4; Future; Expected date: 02/17/2022    Labs and/or tests have been ordered for the evaluation/monitoring of acute/chronic conditions, to be done just before next visit.    Chronic Pain Notes:  Have discussed the risks and benefits of chronic narcotic therapy including pain control, dependence, and addiction.  The patient is aware and accepts the risks and benefits.Patient is aware of the risk of taking narcotics combined with benzodiazepines/muscle relaxers/hypnotics, including but not limited to breathing difficulties, coma, and death; accepts the risks; and agrees to use medications only as prescribed.    Follow up in about 3 months (around 5/17/2022) for Anxiety, Allergies.        2/17/2022 Pili Aguirre

## 2022-02-17 ENCOUNTER — OFFICE VISIT (OUTPATIENT)
Dept: FAMILY MEDICINE | Facility: CLINIC | Age: 47
End: 2022-02-17
Payer: COMMERCIAL

## 2022-02-17 VITALS
WEIGHT: 199 LBS | BODY MASS INDEX: 31.98 KG/M2 | HEIGHT: 66 IN | DIASTOLIC BLOOD PRESSURE: 68 MMHG | SYSTOLIC BLOOD PRESSURE: 100 MMHG | HEART RATE: 96 BPM

## 2022-02-17 DIAGNOSIS — M54.50 CHRONIC RIGHT-SIDED LOW BACK PAIN WITHOUT SCIATICA: ICD-10-CM

## 2022-02-17 DIAGNOSIS — Z13.6 SCREENING FOR ISCHEMIC HEART DISEASE (IHD): ICD-10-CM

## 2022-02-17 DIAGNOSIS — G89.29 CHRONIC RIGHT-SIDED LOW BACK PAIN WITHOUT SCIATICA: ICD-10-CM

## 2022-02-17 DIAGNOSIS — Z12.31 OTHER SCREENING MAMMOGRAM: ICD-10-CM

## 2022-02-17 DIAGNOSIS — Z13.89 SCREENING FOR BLOOD OR PROTEIN IN URINE: ICD-10-CM

## 2022-02-17 DIAGNOSIS — J30.9 ALLERGIC RHINITIS, UNSPECIFIED SEASONALITY, UNSPECIFIED TRIGGER: ICD-10-CM

## 2022-02-17 DIAGNOSIS — Z79.899 LONG-TERM USE OF HIGH-RISK MEDICATION: ICD-10-CM

## 2022-02-17 DIAGNOSIS — N91.2 AMENORRHEA: ICD-10-CM

## 2022-02-17 DIAGNOSIS — Z13.29 SCREENING FOR ENDOCRINE DISORDER: ICD-10-CM

## 2022-02-17 DIAGNOSIS — Z86.16 HISTORY OF COVID-19: ICD-10-CM

## 2022-02-17 DIAGNOSIS — F41.9 ANXIETY: Primary | ICD-10-CM

## 2022-02-17 PROCEDURE — 99214 OFFICE O/P EST MOD 30 MIN: CPT | Mod: S$GLB,,, | Performed by: FAMILY MEDICINE

## 2022-02-17 PROCEDURE — 3008F PR BODY MASS INDEX (BMI) DOCUMENTED: ICD-10-PCS | Mod: S$GLB,,, | Performed by: FAMILY MEDICINE

## 2022-02-17 PROCEDURE — 3074F PR MOST RECENT SYSTOLIC BLOOD PRESSURE < 130 MM HG: ICD-10-PCS | Mod: S$GLB,,, | Performed by: FAMILY MEDICINE

## 2022-02-17 PROCEDURE — 99214 PR OFFICE/OUTPT VISIT, EST, LEVL IV, 30-39 MIN: ICD-10-PCS | Mod: S$GLB,,, | Performed by: FAMILY MEDICINE

## 2022-02-17 PROCEDURE — 3008F BODY MASS INDEX DOCD: CPT | Mod: S$GLB,,, | Performed by: FAMILY MEDICINE

## 2022-02-17 PROCEDURE — 3074F SYST BP LT 130 MM HG: CPT | Mod: S$GLB,,, | Performed by: FAMILY MEDICINE

## 2022-02-17 PROCEDURE — 3078F DIAST BP <80 MM HG: CPT | Mod: S$GLB,,, | Performed by: FAMILY MEDICINE

## 2022-02-17 PROCEDURE — 3078F PR MOST RECENT DIASTOLIC BLOOD PRESSURE < 80 MM HG: ICD-10-PCS | Mod: S$GLB,,, | Performed by: FAMILY MEDICINE

## 2022-02-17 PROCEDURE — 1160F PR REVIEW ALL MEDS BY PRESCRIBER/CLIN PHARMACIST DOCUMENTED: ICD-10-PCS | Mod: S$GLB,,, | Performed by: FAMILY MEDICINE

## 2022-02-17 PROCEDURE — 1160F RVW MEDS BY RX/DR IN RCRD: CPT | Mod: S$GLB,,, | Performed by: FAMILY MEDICINE

## 2022-02-17 RX ORDER — FLUTICASONE PROPIONATE 50 MCG
1 SPRAY, SUSPENSION (ML) NASAL DAILY
Qty: 18.2 ML | Refills: 3 | Status: SHIPPED | OUTPATIENT
Start: 2022-02-17 | End: 2023-08-09 | Stop reason: SDUPTHER

## 2022-02-17 RX ORDER — HYDROCODONE BITARTRATE AND ACETAMINOPHEN 5; 325 MG/1; MG/1
1 TABLET ORAL EVERY 8 HOURS PRN
Qty: 21 TABLET | Refills: 0 | Status: SHIPPED | OUTPATIENT
Start: 2022-02-17 | End: 2022-05-23 | Stop reason: SDUPTHER

## 2022-02-18 ENCOUNTER — PATIENT MESSAGE (OUTPATIENT)
Dept: FAMILY MEDICINE | Facility: CLINIC | Age: 47
End: 2022-02-18
Payer: COMMERCIAL

## 2022-02-18 DIAGNOSIS — M54.50 CHRONIC RIGHT-SIDED LOW BACK PAIN WITHOUT SCIATICA: Primary | ICD-10-CM

## 2022-02-18 DIAGNOSIS — G89.29 CHRONIC RIGHT-SIDED LOW BACK PAIN WITHOUT SCIATICA: Primary | ICD-10-CM

## 2022-02-18 RX ORDER — METHOCARBAMOL 750 MG/1
750 TABLET, FILM COATED ORAL 2 TIMES DAILY PRN
Qty: 60 TABLET | Refills: 5 | Status: SHIPPED | OUTPATIENT
Start: 2022-02-18 | End: 2022-09-07 | Stop reason: SDUPTHER

## 2022-03-16 ENCOUNTER — HOSPITAL ENCOUNTER (OUTPATIENT)
Dept: RADIOLOGY | Facility: HOSPITAL | Age: 47
Discharge: HOME | End: 2022-03-16
Attending: FAMILY MEDICINE
Payer: COMMERCIAL

## 2022-03-16 ENCOUNTER — PATIENT MESSAGE (OUTPATIENT)
Dept: FAMILY MEDICINE | Facility: CLINIC | Age: 47
End: 2022-03-16
Payer: COMMERCIAL

## 2022-03-16 DIAGNOSIS — Z12.31 OTHER SCREENING MAMMOGRAM: ICD-10-CM

## 2022-04-27 DIAGNOSIS — F41.9 ANXIETY: ICD-10-CM

## 2022-04-27 RX ORDER — ALPRAZOLAM 0.5 MG/1
0.5 TABLET ORAL 3 TIMES DAILY
Qty: 270 TABLET | Refills: 0 | Status: SHIPPED | OUTPATIENT
Start: 2022-04-27 | End: 2022-07-27 | Stop reason: SDUPTHER

## 2022-04-27 NOTE — TELEPHONE ENCOUNTER
The patient's prescription has been approved and sent to   [facility]/pharm[facility] #[id number] - [place], [place] - [address]  [address] [address]  Phone: [phone] Fax: [phone]

## 2022-05-12 ENCOUNTER — TELEPHONE (OUTPATIENT)
Dept: FAMILY MEDICINE | Facility: CLINIC | Age: 47
End: 2022-05-12

## 2022-05-17 LAB
ALBUMIN SERPL-MCNC: 4.1 G/DL (ref 3.6–5.1)
ALBUMIN/GLOB SERPL: 1.9 (CALC) (ref 1–2.5)
ALP SERPL-CCNC: 70 U/L (ref 31–125)
ALT SERPL-CCNC: 14 U/L (ref 6–29)
APPEARANCE UR: CLEAR
AST SERPL-CCNC: 14 U/L (ref 10–35)
BACTERIA #/AREA URNS HPF: NORMAL /HPF
BACTERIA UR CULT: NORMAL
BASOPHILS # BLD AUTO: 0 CELLS/UL (ref 0–200)
BASOPHILS NFR BLD AUTO: 0 %
BILIRUB SERPL-MCNC: 0.3 MG/DL (ref 0.2–1.2)
BILIRUB UR QL STRIP: NEGATIVE
BUN SERPL-MCNC: 10 MG/DL (ref 7–25)
BUN/CREAT SERPL: ABNORMAL (CALC) (ref 6–22)
CALCIUM SERPL-MCNC: 8.6 MG/DL (ref 8.6–10.2)
CHLORIDE SERPL-SCNC: 109 MMOL/L (ref 98–110)
CHOLEST SERPL-MCNC: 195 MG/DL
CHOLEST/HDLC SERPL: 3.8 (CALC)
CO2 SERPL-SCNC: 26 MMOL/L (ref 20–32)
COLOR UR: YELLOW
CREAT SERPL-MCNC: 0.78 MG/DL (ref 0.5–1.1)
EOSINOPHIL # BLD AUTO: 0 CELLS/UL (ref 15–500)
EOSINOPHIL NFR BLD AUTO: 0 %
ERYTHROCYTE [DISTWIDTH] IN BLOOD BY AUTOMATED COUNT: 12.2 % (ref 11–15)
FSH SERPL-ACNC: 14.8 MIU/ML
GLOBULIN SER CALC-MCNC: 2.2 G/DL (CALC) (ref 1.9–3.7)
GLUCOSE SERPL-MCNC: 104 MG/DL (ref 65–99)
GLUCOSE UR QL STRIP: NEGATIVE
HCT VFR BLD AUTO: 41.3 % (ref 35–45)
HDLC SERPL-MCNC: 51 MG/DL
HGB BLD-MCNC: 13.7 G/DL (ref 11.7–15.5)
HGB UR QL STRIP: NEGATIVE
HYALINE CASTS #/AREA URNS LPF: NORMAL /LPF
KETONES UR QL STRIP: NEGATIVE
LDLC SERPL CALC-MCNC: 128 MG/DL (CALC)
LEUKOCYTE ESTERASE UR QL STRIP: NEGATIVE
LYMPHOCYTES # BLD AUTO: 1392 CELLS/UL (ref 850–3900)
LYMPHOCYTES NFR BLD AUTO: 29 %
MCH RBC QN AUTO: 31 PG (ref 27–33)
MCHC RBC AUTO-ENTMCNC: 33.2 G/DL (ref 32–36)
MCV RBC AUTO: 93.4 FL (ref 80–100)
MONOCYTES # BLD AUTO: 230 CELLS/UL (ref 200–950)
MONOCYTES NFR BLD AUTO: 4.8 %
NEUTROPHILS # BLD AUTO: 3178 CELLS/UL (ref 1500–7800)
NEUTROPHILS NFR BLD AUTO: 66.2 %
NITRITE UR QL STRIP: NEGATIVE
NONHDLC SERPL-MCNC: 144 MG/DL (CALC)
PH UR STRIP: 7.5 [PH] (ref 5–8)
PLATELET # BLD AUTO: 290 THOUSAND/UL (ref 140–400)
PMV BLD REES-ECKER: 9.3 FL (ref 7.5–12.5)
POTASSIUM SERPL-SCNC: 3.9 MMOL/L (ref 3.5–5.3)
PROT SERPL-MCNC: 6.3 G/DL (ref 6.1–8.1)
PROT UR QL STRIP: NEGATIVE
RBC # BLD AUTO: 4.42 MILLION/UL (ref 3.8–5.1)
RBC #/AREA URNS HPF: NORMAL /HPF
SODIUM SERPL-SCNC: 140 MMOL/L (ref 135–146)
SP GR UR STRIP: 1.01 (ref 1–1.03)
SQUAMOUS #/AREA URNS HPF: NORMAL /HPF
TRIGL SERPL-MCNC: 65 MG/DL
TSH SERPL-ACNC: 1.27 MIU/L
WBC # BLD AUTO: 4.8 THOUSAND/UL (ref 3.8–10.8)
WBC #/AREA URNS HPF: NORMAL /HPF

## 2022-05-23 ENCOUNTER — OFFICE VISIT (OUTPATIENT)
Dept: FAMILY MEDICINE | Facility: CLINIC | Age: 47
End: 2022-05-23
Payer: COMMERCIAL

## 2022-05-23 VITALS
DIASTOLIC BLOOD PRESSURE: 70 MMHG | WEIGHT: 192 LBS | HEART RATE: 80 BPM | BODY MASS INDEX: 30.86 KG/M2 | HEIGHT: 66 IN | SYSTOLIC BLOOD PRESSURE: 100 MMHG

## 2022-05-23 DIAGNOSIS — G89.29 CHRONIC RIGHT-SIDED LOW BACK PAIN WITHOUT SCIATICA: ICD-10-CM

## 2022-05-23 DIAGNOSIS — M54.50 CHRONIC RIGHT-SIDED LOW BACK PAIN WITHOUT SCIATICA: ICD-10-CM

## 2022-05-23 DIAGNOSIS — E78.2 MIXED HYPERLIPIDEMIA: ICD-10-CM

## 2022-05-23 DIAGNOSIS — Z12.11 SCREEN FOR COLON CANCER: ICD-10-CM

## 2022-05-23 DIAGNOSIS — G43.709 CHRONIC MIGRAINE WITHOUT AURA WITHOUT STATUS MIGRAINOSUS, NOT INTRACTABLE: ICD-10-CM

## 2022-05-23 DIAGNOSIS — F41.9 ANXIETY: Primary | ICD-10-CM

## 2022-05-23 PROCEDURE — 99214 PR OFFICE/OUTPT VISIT, EST, LEVL IV, 30-39 MIN: ICD-10-PCS | Mod: S$GLB,,, | Performed by: FAMILY MEDICINE

## 2022-05-23 PROCEDURE — 99214 OFFICE O/P EST MOD 30 MIN: CPT | Mod: S$GLB,,, | Performed by: FAMILY MEDICINE

## 2022-05-23 PROCEDURE — 3074F PR MOST RECENT SYSTOLIC BLOOD PRESSURE < 130 MM HG: ICD-10-PCS | Mod: CPTII,S$GLB,, | Performed by: FAMILY MEDICINE

## 2022-05-23 PROCEDURE — 1159F MED LIST DOCD IN RCRD: CPT | Mod: CPTII,S$GLB,, | Performed by: FAMILY MEDICINE

## 2022-05-23 PROCEDURE — 3008F PR BODY MASS INDEX (BMI) DOCUMENTED: ICD-10-PCS | Mod: CPTII,S$GLB,, | Performed by: FAMILY MEDICINE

## 2022-05-23 PROCEDURE — 3078F PR MOST RECENT DIASTOLIC BLOOD PRESSURE < 80 MM HG: ICD-10-PCS | Mod: CPTII,S$GLB,, | Performed by: FAMILY MEDICINE

## 2022-05-23 PROCEDURE — 1160F RVW MEDS BY RX/DR IN RCRD: CPT | Mod: CPTII,S$GLB,, | Performed by: FAMILY MEDICINE

## 2022-05-23 PROCEDURE — 1160F PR REVIEW ALL MEDS BY PRESCRIBER/CLIN PHARMACIST DOCUMENTED: ICD-10-PCS | Mod: CPTII,S$GLB,, | Performed by: FAMILY MEDICINE

## 2022-05-23 PROCEDURE — 3074F SYST BP LT 130 MM HG: CPT | Mod: CPTII,S$GLB,, | Performed by: FAMILY MEDICINE

## 2022-05-23 PROCEDURE — 3008F BODY MASS INDEX DOCD: CPT | Mod: CPTII,S$GLB,, | Performed by: FAMILY MEDICINE

## 2022-05-23 PROCEDURE — 1159F PR MEDICATION LIST DOCUMENTED IN MEDICAL RECORD: ICD-10-PCS | Mod: CPTII,S$GLB,, | Performed by: FAMILY MEDICINE

## 2022-05-23 PROCEDURE — 3078F DIAST BP <80 MM HG: CPT | Mod: CPTII,S$GLB,, | Performed by: FAMILY MEDICINE

## 2022-05-23 RX ORDER — HYDROCODONE BITARTRATE AND ACETAMINOPHEN 5; 325 MG/1; MG/1
1 TABLET ORAL EVERY 8 HOURS PRN
Qty: 21 TABLET | Refills: 0 | Status: SHIPPED | OUTPATIENT
Start: 2022-05-23 | End: 2022-09-07 | Stop reason: SDUPTHER

## 2022-05-23 NOTE — PROGRESS NOTES
SUBJECTIVE:    Patient ID: Becky Sy is a 46 y.o. female.    Chief Complaint: Anxiety (Went over meds verbally, C-scope ordered// SW)    Pt seen to checkup on acute and chronic conditions.      Has stopped putting sugar in her coffee, and has lost 7 pounds. Drinks about 7 cups of coffee, helps her go to the bathroom. Drinking instant coffee.     Effexor is helping her mood from depression. States she is less snappy. Still taking xanax three times daily.    Has been smoking less. Has cut down from 2ppd to 1ppd.     Not really getting hungry since she has been taking topamax. Headaches are doing better. Has ubrelvy for when tylenol or ibuprofen is not going to work.    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   -------------------------------------------  Mammogram UTD 11/2020, due  Cscope due          Office Visit on 02/17/2022   Component Date Value Ref Range Status    Glucose 05/16/2022 104 (A) 65 - 99 mg/dL Final    BUN 05/16/2022 10  7 - 25 mg/dL Final    Creatinine 05/16/2022 0.78  0.50 - 1.10 mg/dL Final    eGFR if non African American 05/16/2022 92  > OR = 60 mL/min/1.73m2 Final    eGFR if  05/16/2022 106  > OR = 60 mL/min/1.73m2 Final    BUN/Creatinine Ratio 05/16/2022 NOT APPLICABLE  6 - 22 (calc) Final    Sodium 05/16/2022 140  135 - 146 mmol/L Final    Potassium 05/16/2022 3.9  3.5 - 5.3 mmol/L Final    Chloride 05/16/2022 109  98 - 110 mmol/L Final    CO2 05/16/2022 26  20 - 32 mmol/L Final    Calcium 05/16/2022 8.6  8.6 - 10.2 mg/dL Final    Total Protein 05/16/2022 6.3  6.1 - 8.1 g/dL Final    Albumin 05/16/2022 4.1  3.6 - 5.1 g/dL Final    Globulin, Total 05/16/2022 2.2  1.9 - 3.7 g/dL (calc) Final    Albumin/Globulin Ratio 05/16/2022 1.9  1.0 - 2.5 (calc) Final     Total Bilirubin 05/16/2022 0.3  0.2 - 1.2 mg/dL Final    Alkaline Phosphatase 05/16/2022 70  31 - 125 U/L Final    AST 05/16/2022 14  10 - 35 U/L Final    ALT 05/16/2022 14  6 - 29 U/L Final    Cholesterol 05/16/2022 195  <200 mg/dL Final    HDL 05/16/2022 51  > OR = 50 mg/dL Final    Triglycerides 05/16/2022 65  <150 mg/dL Final    LDL Cholesterol 05/16/2022 128 (A) mg/dL (calc) Final    HDL/Cholesterol Ratio 05/16/2022 3.8  <5.0 (calc) Final    Non HDL Chol. (LDL+VLDL) 05/16/2022 144 (A) <130 mg/dL (calc) Final    Color, UA 05/16/2022 YELLOW  YELLOW Final    Appearance, UA 05/16/2022 CLEAR  CLEAR Final    Specific Gravity, UA 05/16/2022 1.007  1.001 - 1.035 Final    pH, UA 05/16/2022 7.5  5.0 - 8.0 Final    Glucose, UA 05/16/2022 NEGATIVE  NEGATIVE Final    Bilirubin, UA 05/16/2022 NEGATIVE  NEGATIVE Final    Ketones, UA 05/16/2022 NEGATIVE  NEGATIVE Final    Occult Blood UA 05/16/2022 NEGATIVE  NEGATIVE Final    Protein, UA 05/16/2022 NEGATIVE  NEGATIVE Final    Nitrite, UA 05/16/2022 NEGATIVE  NEGATIVE Final    Leukocytes, UA 05/16/2022 NEGATIVE  NEGATIVE Final    WBC Casts, UA 05/16/2022 NONE SEEN  < OR = 5 /HPF Final    RBC Casts, UA 05/16/2022 NONE SEEN  < OR = 2 /HPF Final    Squam Epithel, UA 05/16/2022 0-5  < OR = 5 /HPF Final    Bacteria, UA 05/16/2022 NONE SEEN  NONE SEEN /HPF Final    Hyaline Casts, UA 05/16/2022 NONE SEEN  NONE SEEN /LPF Final    Reflexive Urine Culture 05/16/2022    Final    TSH w/reflex to FT4 05/16/2022 1.27  mIU/L Final    WBC 05/16/2022 4.8  3.8 - 10.8 Thousand/uL Final    RBC 05/16/2022 4.42  3.80 - 5.10 Million/uL Final    Hemoglobin 05/16/2022 13.7  11.7 - 15.5 g/dL Final    Hematocrit 05/16/2022 41.3  35.0 - 45.0 % Final    MCV 05/16/2022 93.4  80.0 - 100.0 fL Final    MCH 05/16/2022 31.0  27.0 - 33.0 pg Final    MCHC 05/16/2022 33.2  32.0 - 36.0 g/dL Final    RDW 05/16/2022 12.2  11.0 - 15.0 % Final    Platelets 05/16/2022 290  140 - 400  Thousand/uL Final    MPV 05/16/2022 9.3  7.5 - 12.5 fL Final    Neutrophils, Abs 05/16/2022 3,178  1,500 - 7,800 cells/uL Final    Lymph # 05/16/2022 1,392  850 - 3,900 cells/uL Final    Mono # 05/16/2022 230  200 - 950 cells/uL Final    Eos # 05/16/2022 0 (A) 15 - 500 cells/uL Final    Baso # 05/16/2022 0  0 - 200 cells/uL Final    Neutrophils Relative 05/16/2022 66.2  % Final    Lymph % 05/16/2022 29.0  % Final    Mono % 05/16/2022 4.8  % Final    Eosinophil % 05/16/2022 0.0  % Final    Basophil % 05/16/2022 0.0  % Final    FSH 05/16/2022 14.8  mIU/mL Final       Past Medical History:   Diagnosis Date    Allergy     Anxiety     Anxiety 1994    on meds    Arthritis 1990    knees miley, miley thumbs    Depression     Fibroids 09/03/2019    Dr Turpin    Menorrhagia 09/03/2019    Dr Turpin    V-tach 2007    resolved itself     Social History     Socioeconomic History    Marital status:    Tobacco Use    Smoking status: Current Every Day Smoker     Packs/day: 0.50     Years: 20.00     Pack years: 10.00     Types: Cigarettes    Smokeless tobacco: Never Used   Substance and Sexual Activity    Alcohol use: Yes     Comment: Occasionally    Drug use: No    Sexual activity: Yes     Partners: Male     Past Surgical History:   Procedure Laterality Date    ARTHROSCOPIC CHONDROPLASTY OF KNEE JOINT Left 1997    BACK SURGERY  2007    jerrell    BREAST BIOPSY Left 1996    ECHOCARDIOGRAPHY  2007    HYSTERECTOMY      laparascopic  1998    diagnostic to remove endometriosis    LAPAROSCOPY-ASSISTED VAGINAL HYSTERECTOMY N/A 9/17/2019    Procedure: HYSTERECTOMY, VAGINAL, LAPAROSCOPY-ASSISTED;  Surgeon: Isabella Turpin MD;  Location: [facility];  Service: OB/GYN;  Laterality: N/A;    stess test  2007     Family History   Problem Relation Age of Onset    Hypertension Mother        Review of patient's allergies indicates:  No Known Allergies    Current Outpatient Medications:     albuterol (PROVENTIL/VENTOLIN  HFA) 90 mcg/actuation inhaler, , Disp: , Rfl:     ALPRAZolam (XANAX) 0.5 MG tablet, Take 1 tablet (0.5 mg total) by mouth 3 (three) times daily., Disp: 270 tablet, Rfl: 0    azelastine (ASTELIN) 137 mcg (0.1 %) nasal spray, SPRAY 1 SPRAY IN EACH NOSTRIL 2 TIMES PER DAY, Disp: , Rfl:     CANNABIDIOL, CBD, EXTRACT ORAL, Place under the tongue once daily., Disp: , Rfl:     cetirizine (ZYRTEC) 10 MG tablet, , Disp: , Rfl:     famotidine (PEPCID) 20 MG tablet, Take 1 tablet (20 mg total) by mouth 2 (two) times daily., Disp: 60 tablet, Rfl: 0    fluticasone propionate (FLONASE) 50 mcg/actuation nasal spray, 1 spray (50 mcg total) by Each Nostril route once daily., Disp: 18.2 mL, Rfl: 3    ibuprofen (ADVIL,MOTRIN) 600 MG tablet, Take 1 tablet (600 mg total) by mouth every 6 (six) hours as needed., Disp: , Rfl: 0    meclizine (ANTIVERT) 25 mg tablet, Take 1 tablet (25 mg total) by mouth 3 (three) times daily as needed for Dizziness., Disp: 90 tablet, Rfl: 0    methocarbamoL (ROBAXIN) 750 MG Tab, Take 1 tablet (750 mg total) by mouth 2 (two) times daily as needed (spasm)., Disp: 60 tablet, Rfl: 5    multivitamin capsule, Take 1 capsule by mouth once daily., Disp: , Rfl:     topiramate (TOPAMAX) 25 MG tablet, Take 1 tablet (25 mg total) by mouth every evening., Disp: 30 tablet, Rfl: 0    UBROGEPANT 50 mg tablet, Take by mouth., Disp: , Rfl:     venlafaxine (EFFEXOR-XR) 150 MG Cp24, Take 1 capsule (150 mg total) by mouth once daily., Disp: 30 capsule, Rfl: 5    HYDROcodone-acetaminophen (NORCO) 5-325 mg per tablet, Take 1 tablet by mouth every 8 (eight) hours as needed for Pain., Disp: 21 tablet, Rfl: 0    Review of Systems   Constitutional: Negative for appetite change, fatigue, fever and unexpected weight change.   HENT: Negative for congestion, postnasal drip, rhinorrhea and sneezing.    Respiratory: Negative for cough, chest tightness, shortness of breath and wheezing.    Cardiovascular: Negative for chest  "pain and leg swelling.   Gastrointestinal: Negative for abdominal pain, constipation, nausea and vomiting.        -heartburn   Genitourinary: Negative for difficulty urinating, dysuria, frequency and urgency.   Musculoskeletal: Positive for neck pain. Negative for arthralgias, back pain and myalgias.   Skin: Negative for rash.   Allergic/Immunologic: Positive for environmental allergies.   Neurological: Negative for dizziness, weakness, numbness and headaches.   Hematological: Does not bruise/bleed easily.   Psychiatric/Behavioral: Positive for sleep disturbance. Negative for dysphoric mood and suicidal ideas. The patient is nervous/anxious.    All other systems reviewed and are negative.         Objective:      Vitals:    05/23/22 1027   BP: 100/70   Pulse: 80   Weight: 87.1 kg (192 lb)   Height: 5' 6" (1.676 m)     Wt Readings from Last 3 Encounters:   05/23/22 87.1 kg (192 lb)   02/17/22 90.3 kg (199 lb)   11/17/21 90.3 kg (199 lb)       Physical Exam  Vitals reviewed.   Constitutional:       General: She is not in acute distress.     Appearance: Normal appearance. She is well-developed. She is obese.   HENT:      Head: Normocephalic and atraumatic.   Neck:      Thyroid: No thyromegaly.   Cardiovascular:      Rate and Rhythm: Normal rate and regular rhythm.      Heart sounds: Normal heart sounds. No murmur heard.    No friction rub.   Pulmonary:      Effort: Pulmonary effort is normal.      Breath sounds: Normal breath sounds. No wheezing or rales.   Abdominal:      General: Bowel sounds are normal. There is no distension.      Palpations: Abdomen is soft.      Tenderness: There is no abdominal tenderness.   Musculoskeletal:      Cervical back: Neck supple. No spinous process tenderness.   Lymphadenopathy:      Cervical: No cervical adenopathy.   Skin:     General: Skin is warm and dry.      Findings: No rash.   Neurological:      Mental Status: She is alert and oriented to person, place, and time.   Psychiatric: "         Attention and Perception: She is attentive.         Speech: Speech normal.         Behavior: Behavior normal.         Thought Content: Thought content normal.         Judgment: Judgment normal.           Assessment:       1. Anxiety    2. Chronic right-sided low back pain without sciatica    3. Chronic migraine without aura without status migrainosus, not intractable    4. Mixed hyperlipidemia    5. Screen for colon cancer         Plan:       Anxiety  Comments:  Controlled. Will continue to monitor symptoms.    Chronic right-sided low back pain without sciatica  Comments:  Intermittent. Pt given short rx for exacerbations.   Orders:  -     HYDROcodone-acetaminophen (NORCO) 5-325 mg per tablet; Take 1 tablet by mouth every 8 (eight) hours as needed for Pain.  Dispense: 21 tablet; Refill: 0    Chronic migraine without aura without status migrainosus, not intractable  Comments:  Chronic. Will continue to monitor frequency and severity of headaches.     Mixed hyperlipidemia  Comments:  Suboptimal. .  Discussed dietary discretion and encouraged exercise.     Screen for colon cancer  Comments:  Will refer to GI.   Orders:  -     Ambulatory referral/consult to Gastroenterology; Future; Expected date: 05/30/2022    Other  Lab results discussed and reviewed with patient.    Chronic Pain Notes:  Have discussed the risks and benefits of chronic narcotic therapy including pain control, dependence, and addiction.  The patient is aware and accepts the risks and benefits. Patient is aware of the risk of taking narcotics combined with benzodiazepines/muscle relaxers/hypnotics, including but not limited to breathing difficulties, coma, and death; accepts the risks; and agrees to use medications only as prescribed.    Follow up in about 3 months (around 8/23/2022).        5/23/2022 Pili Aguirre

## 2022-07-18 ENCOUNTER — PATIENT MESSAGE (OUTPATIENT)
Dept: FAMILY MEDICINE | Facility: CLINIC | Age: 47
End: 2022-07-18

## 2022-07-27 ENCOUNTER — PATIENT MESSAGE (OUTPATIENT)
Dept: FAMILY MEDICINE | Facility: CLINIC | Age: 47
End: 2022-07-27

## 2022-07-27 RX ORDER — VENLAFAXINE HYDROCHLORIDE 150 MG/1
150 CAPSULE, EXTENDED RELEASE ORAL DAILY
Qty: 90 CAPSULE | Refills: 1 | Status: SHIPPED | OUTPATIENT
Start: 2022-07-27 | End: 2023-08-09

## 2022-07-27 NOTE — TELEPHONE ENCOUNTER
The patient's prescription has been approved and sent to   [facility] #[id number] - [place], [place] - [address]  [address] [id number]  Phone: [phone] Fax: [phone]

## 2022-09-07 ENCOUNTER — OFFICE VISIT (OUTPATIENT)
Dept: FAMILY MEDICINE | Facility: CLINIC | Age: 47
End: 2022-09-07
Payer: COMMERCIAL

## 2022-09-07 VITALS
DIASTOLIC BLOOD PRESSURE: 72 MMHG | HEART RATE: 90 BPM | WEIGHT: 196 LBS | SYSTOLIC BLOOD PRESSURE: 104 MMHG | BODY MASS INDEX: 31.5 KG/M2 | HEIGHT: 66 IN

## 2022-09-07 DIAGNOSIS — F33.9 RECURRENT MAJOR DEPRESSIVE DISORDER, REMISSION STATUS UNSPECIFIED: Primary | ICD-10-CM

## 2022-09-07 DIAGNOSIS — M54.50 CHRONIC RIGHT-SIDED LOW BACK PAIN WITHOUT SCIATICA: ICD-10-CM

## 2022-09-07 DIAGNOSIS — G43.709 CHRONIC MIGRAINE WITHOUT AURA WITHOUT STATUS MIGRAINOSUS, NOT INTRACTABLE: ICD-10-CM

## 2022-09-07 DIAGNOSIS — G89.29 CHRONIC RIGHT-SIDED LOW BACK PAIN WITHOUT SCIATICA: ICD-10-CM

## 2022-09-07 DIAGNOSIS — F41.9 ANXIETY: ICD-10-CM

## 2022-09-07 PROCEDURE — 3078F DIAST BP <80 MM HG: CPT | Mod: CPTII,S$GLB,, | Performed by: FAMILY MEDICINE

## 2022-09-07 PROCEDURE — 1159F MED LIST DOCD IN RCRD: CPT | Mod: CPTII,S$GLB,, | Performed by: FAMILY MEDICINE

## 2022-09-07 PROCEDURE — 3078F PR MOST RECENT DIASTOLIC BLOOD PRESSURE < 80 MM HG: ICD-10-PCS | Mod: CPTII,S$GLB,, | Performed by: FAMILY MEDICINE

## 2022-09-07 PROCEDURE — 3008F BODY MASS INDEX DOCD: CPT | Mod: CPTII,S$GLB,, | Performed by: FAMILY MEDICINE

## 2022-09-07 PROCEDURE — 3074F SYST BP LT 130 MM HG: CPT | Mod: CPTII,S$GLB,, | Performed by: FAMILY MEDICINE

## 2022-09-07 PROCEDURE — 1159F PR MEDICATION LIST DOCUMENTED IN MEDICAL RECORD: ICD-10-PCS | Mod: CPTII,S$GLB,, | Performed by: FAMILY MEDICINE

## 2022-09-07 PROCEDURE — 1160F RVW MEDS BY RX/DR IN RCRD: CPT | Mod: CPTII,S$GLB,, | Performed by: FAMILY MEDICINE

## 2022-09-07 PROCEDURE — 3074F PR MOST RECENT SYSTOLIC BLOOD PRESSURE < 130 MM HG: ICD-10-PCS | Mod: CPTII,S$GLB,, | Performed by: FAMILY MEDICINE

## 2022-09-07 PROCEDURE — 99214 OFFICE O/P EST MOD 30 MIN: CPT | Mod: S$GLB,,, | Performed by: FAMILY MEDICINE

## 2022-09-07 PROCEDURE — 3008F PR BODY MASS INDEX (BMI) DOCUMENTED: ICD-10-PCS | Mod: CPTII,S$GLB,, | Performed by: FAMILY MEDICINE

## 2022-09-07 PROCEDURE — 1160F PR REVIEW ALL MEDS BY PRESCRIBER/CLIN PHARMACIST DOCUMENTED: ICD-10-PCS | Mod: CPTII,S$GLB,, | Performed by: FAMILY MEDICINE

## 2022-09-07 PROCEDURE — 99214 PR OFFICE/OUTPT VISIT, EST, LEVL IV, 30-39 MIN: ICD-10-PCS | Mod: S$GLB,,, | Performed by: FAMILY MEDICINE

## 2022-09-07 RX ORDER — DESVENLAFAXINE SUCCINATE 50 MG/1
50 TABLET, EXTENDED RELEASE ORAL DAILY
Qty: 30 TABLET | Refills: 11 | Status: SHIPPED | OUTPATIENT
Start: 2022-09-07 | End: 2023-08-09 | Stop reason: SDUPTHER

## 2022-09-07 RX ORDER — UBROGEPANT 50 MG/1
50 TABLET ORAL DAILY PRN
Qty: 10 TABLET | Refills: 2 | Status: SHIPPED | OUTPATIENT
Start: 2022-09-07 | End: 2023-10-03 | Stop reason: SDUPTHER

## 2022-09-07 RX ORDER — METHOCARBAMOL 750 MG/1
750 TABLET, FILM COATED ORAL 2 TIMES DAILY PRN
Qty: 60 TABLET | Refills: 5 | Status: SHIPPED | OUTPATIENT
Start: 2022-09-07 | End: 2023-08-09 | Stop reason: SDUPTHER

## 2022-09-07 RX ORDER — HYDROCODONE BITARTRATE AND ACETAMINOPHEN 5; 325 MG/1; MG/1
1 TABLET ORAL EVERY 8 HOURS PRN
Qty: 21 TABLET | Refills: 0 | Status: SHIPPED | OUTPATIENT
Start: 2022-09-07 | End: 2023-08-09 | Stop reason: SDUPTHER

## 2022-09-07 NOTE — PROGRESS NOTES
SUBJECTIVE:    Patient ID: Becky Sy is a 46 y.o. female.    Chief Complaint: Follow-up (Did not bring bottles//needs refills//needs orders for diagnostic mammogram and US//bs)    Pt seen to checkup on acute and chronic conditions.      Pt concerned about her weight which is stable. Pt has not been eating breakfast, sometimes lunch. Has some swelling in the legs.     Effexor is helping somewhat. Though she is getting brain zaps even though she is not forgetting to take her meds.  That has started within the last week.  Still taking xanax three times daily. (Failed effexor, luvox) has not taken THC drops in a while. Trouble sleeping, but is doing a lot of of her projects at night when the kids go to sleep. Tired during the day, but then not during the day.     Has been smoking less. Has cut down from 1ppd to 1/2ppd.     Unsure if topamax was working. No longer taking. But the ubrelvy did help, which she got from Dr. Alfredo. Pt continues to have neck pain, and was supposed to be havingan MRI but cant get Dr. Alfredo' office to call her back to make a f/u appt. She has been trying to do stretching exercises as well in order to help with the discomfort and when she has paresthesias in her arms.     No recent labs.    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     -------------------------------------------  Mammogram UTD 11/2020, due, ordered.  Cscope due          No visits with results within 6 Month(s) from this visit.   Latest known visit with results is:   Office Visit on 02/17/2022   Component Date Value Ref Range Status    Glucose 05/16/2022 104 (H)  65 - 99 mg/dL Final    BUN 05/16/2022 10  7 - 25 mg/dL Final    Creatinine 05/16/2022 0.78  0.50 - 1.10 mg/dL Final    eGFR if non African American 05/16/2022 92  > OR = 60 mL/min/1.73m2 Final    eGFR if African American  05/16/2022 106  > OR = 60 mL/min/1.73m2 Final    BUN/Creatinine Ratio 05/16/2022 NOT APPLICABLE  6 - 22 (calc) Final    Sodium 05/16/2022 140  135 - 146 mmol/L Final    Potassium 05/16/2022 3.9  3.5 - 5.3 mmol/L Final    Chloride 05/16/2022 109  98 - 110 mmol/L Final    CO2 05/16/2022 26  20 - 32 mmol/L Final    Calcium 05/16/2022 8.6  8.6 - 10.2 mg/dL Final    Total Protein 05/16/2022 6.3  6.1 - 8.1 g/dL Final    Albumin 05/16/2022 4.1  3.6 - 5.1 g/dL Final    Globulin, Total 05/16/2022 2.2  1.9 - 3.7 g/dL (calc) Final    Albumin/Globulin Ratio 05/16/2022 1.9  1.0 - 2.5 (calc) Final    Total Bilirubin 05/16/2022 0.3  0.2 - 1.2 mg/dL Final    Alkaline Phosphatase 05/16/2022 70  31 - 125 U/L Final    AST 05/16/2022 14  10 - 35 U/L Final    ALT 05/16/2022 14  6 - 29 U/L Final    Cholesterol 05/16/2022 195  <200 mg/dL Final    HDL 05/16/2022 51  > OR = 50 mg/dL Final    Triglycerides 05/16/2022 65  <150 mg/dL Final    LDL Cholesterol 05/16/2022 128 (H)  mg/dL (calc) Final    HDL/Cholesterol Ratio 05/16/2022 3.8  <5.0 (calc) Final    Non HDL Chol. (LDL+VLDL) 05/16/2022 144 (H)  <130 mg/dL (calc) Final    Color, UA 05/16/2022 YELLOW  YELLOW Final    Appearance, UA 05/16/2022 CLEAR  CLEAR Final    Specific Gravity, UA 05/16/2022 1.007  1.001 - 1.035 Final    pH, UA 05/16/2022 7.5  5.0 - 8.0 Final    Glucose, UA 05/16/2022 NEGATIVE  NEGATIVE Final    Bilirubin, UA 05/16/2022 NEGATIVE  NEGATIVE Final    Ketones, UA 05/16/2022 NEGATIVE  NEGATIVE Final    Occult Blood UA 05/16/2022 NEGATIVE  NEGATIVE Final    Protein, UA 05/16/2022 NEGATIVE  NEGATIVE Final    Nitrite, UA 05/16/2022 NEGATIVE  NEGATIVE Final    Leukocytes, UA 05/16/2022 NEGATIVE  NEGATIVE Final    WBC Casts, UA 05/16/2022 NONE SEEN  < OR = 5 /HPF Final    RBC Casts, UA 05/16/2022 NONE SEEN  < OR = 2 /HPF Final    Squam Epithel, UA 05/16/2022 0-5  < OR = 5 /HPF Final    Bacteria, UA 05/16/2022 NONE SEEN  NONE SEEN /HPF Final    Hyaline Casts, UA 05/16/2022 NONE  SEEN  NONE SEEN /LPF Final    Reflexive Urine Culture 05/16/2022    Final    TSH w/reflex to FT4 05/16/2022 1.27  mIU/L Final    WBC 05/16/2022 4.8  3.8 - 10.8 Thousand/uL Final    RBC 05/16/2022 4.42  3.80 - 5.10 Million/uL Final    Hemoglobin 05/16/2022 13.7  11.7 - 15.5 g/dL Final    Hematocrit 05/16/2022 41.3  35.0 - 45.0 % Final    MCV 05/16/2022 93.4  80.0 - 100.0 fL Final    MCH 05/16/2022 31.0  27.0 - 33.0 pg Final    MCHC 05/16/2022 33.2  32.0 - 36.0 g/dL Final    RDW 05/16/2022 12.2  11.0 - 15.0 % Final    Platelets 05/16/2022 290  140 - 400 Thousand/uL Final    MPV 05/16/2022 9.3  7.5 - 12.5 fL Final    Neutrophils, Abs 05/16/2022 3,178  1,500 - 7,800 cells/uL Final    Lymph # 05/16/2022 1,392  850 - 3,900 cells/uL Final    Mono # 05/16/2022 230  200 - 950 cells/uL Final    Eos # 05/16/2022 0 (L)  15 - 500 cells/uL Final    Baso # 05/16/2022 0  0 - 200 cells/uL Final    Neutrophils Relative 05/16/2022 66.2  % Final    Lymph % 05/16/2022 29.0  % Final    Mono % 05/16/2022 4.8  % Final    Eosinophil % 05/16/2022 0.0  % Final    Basophil % 05/16/2022 0.0  % Final    FSH 05/16/2022 14.8  mIU/mL Final       Past Medical History:   Diagnosis Date    Allergy     Anxiety     Anxiety 1994    on meds    Arthritis 1990    knees miley, miley thumbs    Depression     Fibroids 09/03/2019    Dr Turpin    Menorrhagia 09/03/2019    Dr Turpin    V-tach 2007    resolved itself     Social History     Socioeconomic History    Marital status:    Tobacco Use    Smoking status: Every Day     Packs/day: 0.50     Years: 20.00     Pack years: 10.00     Types: Cigarettes    Smokeless tobacco: Never   Substance and Sexual Activity    Alcohol use: Yes     Comment: Occasionally    Drug use: No    Sexual activity: Yes     Partners: Male     Past Surgical History:   Procedure Laterality Date    ARTHROSCOPIC CHONDROPLASTY OF KNEE JOINT Left 1997    BACK SURGERY  2007    jerrell    BREAST BIOPSY Left 1996    ECHOCARDIOGRAPHY  2007     HYSTERECTOMY      laparascopic  1998    diagnostic to remove endometriosis    LAPAROSCOPY-ASSISTED VAGINAL HYSTERECTOMY N/A 9/17/2019    Procedure: HYSTERECTOMY, VAGINAL, LAPAROSCOPY-ASSISTED;  Surgeon: Isabella Turpin MD;  Location: [facility];  Service: OB/GYN;  Laterality: N/A;    stess test  2007     Family History   Problem Relation Age of Onset    Hypertension Mother        Review of patient's allergies indicates:  No Known Allergies    Current Outpatient Medications:     albuterol (PROVENTIL/VENTOLIN HFA) 90 mcg/actuation inhaler, , Disp: , Rfl:     ALPRAZolam (XANAX) 0.5 MG tablet, Take 1 tablet (0.5 mg total) by mouth 3 (three) times daily., Disp: 270 tablet, Rfl: 0    azelastine (ASTELIN) 137 mcg (0.1 %) nasal spray, SPRAY 1 SPRAY IN EACH NOSTRIL 2 TIMES PER DAY, Disp: , Rfl:     CANNABIDIOL, CBD, EXTRACT ORAL, Place under the tongue once daily., Disp: , Rfl:     cetirizine (ZYRTEC) 10 MG tablet, , Disp: , Rfl:     famotidine (PEPCID) 20 MG tablet, Take 1 tablet (20 mg total) by mouth 2 (two) times daily., Disp: 60 tablet, Rfl: 0    fluticasone propionate (FLONASE) 50 mcg/actuation nasal spray, 1 spray (50 mcg total) by Each Nostril route once daily., Disp: 18.2 mL, Rfl: 3    ibuprofen (ADVIL,MOTRIN) 600 MG tablet, Take 1 tablet (600 mg total) by mouth every 6 (six) hours as needed., Disp: , Rfl: 0    meclizine (ANTIVERT) 25 mg tablet, Take 1 tablet (25 mg total) by mouth 3 (three) times daily as needed for Dizziness., Disp: 90 tablet, Rfl: 0    multivitamin capsule, Take 1 capsule by mouth once daily., Disp: , Rfl:     venlafaxine (EFFEXOR-XR) 150 MG Cp24, Take 1 capsule (150 mg total) by mouth once daily., Disp: 90 capsule, Rfl: 1    desvenlafaxine succinate (PRISTIQ) 50 MG Tb24, Take 1 tablet (50 mg total) by mouth once daily., Disp: 30 tablet, Rfl: 11    HYDROcodone-acetaminophen (NORCO) 5-325 mg per tablet, Take 1 tablet by mouth every 8 (eight) hours as needed for Pain., Disp: 21 tablet, Rfl: 0     "methocarbamoL (ROBAXIN) 750 MG Tab, Take 1 tablet (750 mg total) by mouth 2 (two) times daily as needed (spasm)., Disp: 60 tablet, Rfl: 5    UBRELVY 50 mg tablet, Take 1 tablet (50 mg total) by mouth daily as needed for Migraine., Disp: 10 tablet, Rfl: 2    Review of Systems   Constitutional:  Negative for appetite change, fatigue, fever and unexpected weight change.   HENT:  Negative for congestion, postnasal drip, rhinorrhea and sneezing.    Respiratory:  Negative for cough, chest tightness, shortness of breath and wheezing.    Cardiovascular:  Positive for leg swelling. Negative for chest pain.   Gastrointestinal:  Negative for abdominal pain, constipation, nausea and vomiting.        -heartburn   Genitourinary:  Negative for difficulty urinating, dysuria, frequency and urgency.   Musculoskeletal:  Positive for neck pain. Negative for arthralgias, back pain and myalgias.   Skin:  Negative for rash.   Allergic/Immunologic: Positive for environmental allergies.   Neurological:  Negative for dizziness, weakness, numbness and headaches.   Hematological:  Does not bruise/bleed easily.   Psychiatric/Behavioral:  Positive for sleep disturbance. Negative for dysphoric mood and suicidal ideas. The patient is nervous/anxious.    All other systems reviewed and are negative.       Objective:      Vitals:    09/07/22 1215   BP: 104/72   Pulse: 90   Weight: 88.9 kg (196 lb)   Height: 5' 6" (1.676 m)     Wt Readings from Last 3 Encounters:   09/07/22 88.9 kg (196 lb)   05/23/22 87.1 kg (192 lb)   02/17/22 90.3 kg (199 lb)       Physical Exam  Vitals reviewed.   Constitutional:       General: She is not in acute distress.     Appearance: Normal appearance. She is well-developed. She is obese.   HENT:      Head: Normocephalic and atraumatic.   Neck:      Thyroid: No thyromegaly.   Cardiovascular:      Rate and Rhythm: Normal rate and regular rhythm.      Heart sounds: Normal heart sounds. No murmur heard.    No friction rub. "   Pulmonary:      Effort: Pulmonary effort is normal.      Breath sounds: Normal breath sounds. No wheezing or rales.   Abdominal:      General: Bowel sounds are normal. There is no distension.      Palpations: Abdomen is soft.      Tenderness: There is no abdominal tenderness.   Musculoskeletal:      Cervical back: Neck supple. No spinous process tenderness.      Right lower leg: Edema present.      Left lower leg: Edema present.   Lymphadenopathy:      Cervical: No cervical adenopathy.   Skin:     General: Skin is warm and dry.      Findings: No rash.   Neurological:      Mental Status: She is alert and oriented to person, place, and time.   Psychiatric:         Attention and Perception: She is attentive.         Speech: Speech normal.         Behavior: Behavior normal.         Thought Content: Thought content normal.         Judgment: Judgment normal.         Assessment:       1. Recurrent major depressive disorder, remission status unspecified    2. Chronic right-sided low back pain without sciatica    3. Anxiety    4. Chronic migraine without aura without status migrainosus, not intractable         Plan:       Recurrent major depressive disorder, remission status unspecified  Comments:  Symptomatic. Will try on pristiq due to side effects with effexor  Orders:  -     desvenlafaxine succinate (PRISTIQ) 50 MG Tb24; Take 1 tablet (50 mg total) by mouth once daily.  Dispense: 30 tablet; Refill: 11    Chronic right-sided low back pain without sciatica  Comments:  Intermittent. Pt given short rx for exacerbations.   Orders:  -     HYDROcodone-acetaminophen (NORCO) 5-325 mg per tablet; Take 1 tablet by mouth every 8 (eight) hours as needed for Pain.  Dispense: 21 tablet; Refill: 0  -     methocarbamoL (ROBAXIN) 750 MG Tab; Take 1 tablet (750 mg total) by mouth 2 (two) times daily as needed (spasm).  Dispense: 60 tablet; Refill: 5    Anxiety  Comments:  Symptomatic. Will continue to monitor symptoms.  Orders:  -      desvenlafaxine succinate (PRISTIQ) 50 MG Tb24; Take 1 tablet (50 mg total) by mouth once daily.  Dispense: 30 tablet; Refill: 11    Chronic migraine without aura without status migrainosus, not intractable  Comments:  Chronic. Pt to follow up with Neuro. To continue prn ubrelvy  Orders:  -     UBRELVY 50 mg tablet; Take 1 tablet (50 mg total) by mouth daily as needed for Migraine.  Dispense: 10 tablet; Refill: 2    Other  Lab results discussed and reviewed with patient.    Chronic Pain Notes:  Have discussed the risks and benefits of chronic narcotic therapy including pain control, dependence, and addiction.  The patient is aware and accepts the risks and benefits. Patient is aware of the risk of taking narcotics combined with benzodiazepines/muscle relaxers/hypnotics, including but not limited to breathing difficulties, coma, and death; accepts the risks; and agrees to use medications only as prescribed.    Follow up in about 3 months (around 12/7/2022) for Depression, Arthritis, Anxiety.        9/7/2022 Pili Aguirre

## 2022-09-28 ENCOUNTER — HOSPITAL ENCOUNTER (EMERGENCY)
Facility: HOSPITAL | Age: 47
Discharge: HOME | End: 2022-09-28
Attending: EMERGENCY MEDICINE
Payer: COMMERCIAL

## 2022-09-28 VITALS
HEIGHT: 66 IN | WEIGHT: 188 LBS | TEMPERATURE: 98 F | RESPIRATION RATE: 18 BRPM | SYSTOLIC BLOOD PRESSURE: 115 MMHG | DIASTOLIC BLOOD PRESSURE: 76 MMHG | HEART RATE: 78 BPM | BODY MASS INDEX: 30.22 KG/M2 | OXYGEN SATURATION: 99 %

## 2022-09-28 DIAGNOSIS — R10.9 ABDOMINAL PAIN: ICD-10-CM

## 2022-09-28 DIAGNOSIS — K29.00 ACUTE GASTRITIS WITHOUT HEMORRHAGE, UNSPECIFIED GASTRITIS TYPE: Primary | ICD-10-CM

## 2022-09-28 LAB
ALBUMIN SERPL BCP-MCNC: 4.2 G/DL (ref 3.5–5.2)
ALP SERPL-CCNC: 76 U/L (ref 55–135)
ALT SERPL W/O P-5'-P-CCNC: 19 U/L (ref 10–44)
ANION GAP SERPL CALC-SCNC: 8 MMOL/L (ref 8–16)
AST SERPL-CCNC: 18 U/L (ref 10–40)
BASOPHILS # BLD AUTO: 0 K/UL (ref 0–0.2)
BASOPHILS NFR BLD: 0 % (ref 0–1.9)
BILIRUB SERPL-MCNC: 0.4 MG/DL (ref 0.1–1)
BILIRUB UR QL STRIP: NEGATIVE
BUN SERPL-MCNC: 10 MG/DL (ref 6–20)
CALCIUM SERPL-MCNC: 9.3 MG/DL (ref 8.7–10.5)
CHLORIDE SERPL-SCNC: 106 MMOL/L (ref 95–110)
CLARITY UR: CLEAR
CO2 SERPL-SCNC: 26 MMOL/L (ref 23–29)
COLOR UR: YELLOW
CREAT SERPL-MCNC: 0.8 MG/DL (ref 0.5–1.4)
DIFFERENTIAL METHOD: ABNORMAL
EOSINOPHIL # BLD AUTO: 0 K/UL (ref 0–0.5)
EOSINOPHIL NFR BLD: 0 % (ref 0–8)
ERYTHROCYTE [DISTWIDTH] IN BLOOD BY AUTOMATED COUNT: 13.1 % (ref 11.5–14.5)
EST. GFR  (NO RACE VARIABLE): >60 ML/MIN/1.73 M^2
GLUCOSE SERPL-MCNC: 91 MG/DL (ref 70–110)
GLUCOSE UR QL STRIP: NEGATIVE
HCT VFR BLD AUTO: 41.8 % (ref 37–48.5)
HGB BLD-MCNC: 14.2 G/DL (ref 12–16)
HGB UR QL STRIP: NEGATIVE
IMM GRANULOCYTES # BLD AUTO: 0.01 K/UL (ref 0–0.04)
IMM GRANULOCYTES NFR BLD AUTO: 0.2 % (ref 0–0.5)
KETONES UR QL STRIP: NEGATIVE
LEUKOCYTE ESTERASE UR QL STRIP: NEGATIVE
LIPASE SERPL-CCNC: 43 U/L (ref 4–60)
LYMPHOCYTES # BLD AUTO: 2 K/UL (ref 1–4.8)
LYMPHOCYTES NFR BLD: 34.9 % (ref 18–48)
MCH RBC QN AUTO: 31.8 PG (ref 27–31)
MCHC RBC AUTO-ENTMCNC: 34 G/DL (ref 32–36)
MCV RBC AUTO: 94 FL (ref 82–98)
MONOCYTES # BLD AUTO: 0.4 K/UL (ref 0.3–1)
MONOCYTES NFR BLD: 7 % (ref 4–15)
NEUTROPHILS # BLD AUTO: 3.4 K/UL (ref 1.8–7.7)
NEUTROPHILS NFR BLD: 57.9 % (ref 38–73)
NITRITE UR QL STRIP: NEGATIVE
NRBC BLD-RTO: 0 /100 WBC
PH UR STRIP: 6 [PH] (ref 5–8)
PLATELET # BLD AUTO: 279 K/UL (ref 150–450)
PMV BLD AUTO: 9 FL (ref 9.2–12.9)
POTASSIUM SERPL-SCNC: 3.7 MMOL/L (ref 3.5–5.1)
PROT SERPL-MCNC: 7 G/DL (ref 6–8.4)
PROT UR QL STRIP: NEGATIVE
RBC # BLD AUTO: 4.46 M/UL (ref 4–5.4)
SODIUM SERPL-SCNC: 140 MMOL/L (ref 136–145)
SP GR UR STRIP: <=1.005 (ref 1–1.03)
URN SPEC COLLECT METH UR: NORMAL
UROBILINOGEN UR STRIP-ACNC: NEGATIVE EU/DL
WBC # BLD AUTO: 5.85 K/UL (ref 3.9–12.7)

## 2022-09-28 PROCEDURE — 25500020 PHARM REV CODE 255: Performed by: EMERGENCY MEDICINE

## 2022-09-28 PROCEDURE — 85025 COMPLETE CBC W/AUTO DIFF WBC: CPT | Performed by: NURSE PRACTITIONER

## 2022-09-28 PROCEDURE — 93010 EKG 12-LEAD: ICD-10-PCS | Mod: ,,, | Performed by: INTERNAL MEDICINE

## 2022-09-28 PROCEDURE — 80053 COMPREHEN METABOLIC PANEL: CPT | Performed by: NURSE PRACTITIONER

## 2022-09-28 PROCEDURE — 25000003 PHARM REV CODE 250: Performed by: EMERGENCY MEDICINE

## 2022-09-28 PROCEDURE — 81003 URINALYSIS AUTO W/O SCOPE: CPT | Performed by: NURSE PRACTITIONER

## 2022-09-28 PROCEDURE — 93005 ELECTROCARDIOGRAM TRACING: CPT | Mod: 59 | Performed by: INTERNAL MEDICINE

## 2022-09-28 PROCEDURE — 96374 THER/PROPH/DIAG INJ IV PUSH: CPT

## 2022-09-28 PROCEDURE — 36415 COLL VENOUS BLD VENIPUNCTURE: CPT | Performed by: NURSE PRACTITIONER

## 2022-09-28 PROCEDURE — 93010 ELECTROCARDIOGRAM REPORT: CPT | Mod: ,,, | Performed by: INTERNAL MEDICINE

## 2022-09-28 PROCEDURE — 83690 ASSAY OF LIPASE: CPT | Performed by: NURSE PRACTITIONER

## 2022-09-28 PROCEDURE — 96375 TX/PRO/DX INJ NEW DRUG ADDON: CPT

## 2022-09-28 PROCEDURE — 99285 EMERGENCY DEPT VISIT HI MDM: CPT | Mod: 25

## 2022-09-28 PROCEDURE — 51701 INSERT BLADDER CATHETER: CPT

## 2022-09-28 PROCEDURE — 63600175 PHARM REV CODE 636 W HCPCS: Performed by: EMERGENCY MEDICINE

## 2022-09-28 RX ORDER — HYOSCYAMINE SULFATE 0.5 MG/ML
0.5 INJECTION, SOLUTION SUBCUTANEOUS
Status: COMPLETED | OUTPATIENT
Start: 2022-09-28 | End: 2022-09-28

## 2022-09-28 RX ORDER — ESOMEPRAZOLE MAGNESIUM 40 MG/1
40 CAPSULE, DELAYED RELEASE ORAL DAILY
Qty: 30 CAPSULE | Refills: 0 | Status: SHIPPED | OUTPATIENT
Start: 2022-09-28 | End: 2023-08-09

## 2022-09-28 RX ORDER — FAMOTIDINE 10 MG/ML
20 INJECTION INTRAVENOUS
Status: COMPLETED | OUTPATIENT
Start: 2022-09-28 | End: 2022-09-28

## 2022-09-28 RX ORDER — ONDANSETRON 2 MG/ML
4 INJECTION INTRAMUSCULAR; INTRAVENOUS
Status: COMPLETED | OUTPATIENT
Start: 2022-09-28 | End: 2022-09-28

## 2022-09-28 RX ADMIN — HYOSCYAMINE SULFATE 0.5 MG: 0.5 INJECTION, SOLUTION SUBCUTANEOUS at 08:09

## 2022-09-28 RX ADMIN — ONDANSETRON 4 MG: 2 INJECTION INTRAMUSCULAR; INTRAVENOUS at 08:09

## 2022-09-28 RX ADMIN — FAMOTIDINE 20 MG: 10 INJECTION INTRAVENOUS at 08:09

## 2022-09-28 RX ADMIN — IOHEXOL 100 ML: 350 INJECTION, SOLUTION INTRAVENOUS at 08:09

## 2022-09-29 NOTE — ED PROVIDER NOTES
Encounter Date: 9/28/2022       History     Chief Complaint   Patient presents with    Abdominal Pain     Pt here for abd pain that is burning pain that is all over. She said it started Monday she thought maybe she was getting an ulcer but the pain hasn't stopped.      This is a 46-year-old female with history of depression, menorrhagia status post hysterectomy comes in complaining of abdominal pain.  Patient reports that the pain started 3 days ago.  She reports that the pain is epigastric and diffuse.  It is a burning sensation.  She has noted significant abdominal distention and burning and pain radiating into her chest. She denies any chest pain or shortness of breath.  She denies any weakness or dizziness.  She reports significant nausea but no actual vomiting.  She reports that tonight her symptoms got worse.  She has been taking over-the-counter antacids with no improvement.    Review of patient's allergies indicates:  No Known Allergies  Past Medical History:   Diagnosis Date    Allergy     Anxiety     Anxiety 1994    on meds    Arthritis 1990    knees miley, miley thumbs    Depression     Fibroids 09/03/2019    Dr Turpin    Menorrhagia 09/03/2019    Dr Turpin    V-tach 2007    resolved itself     Past Surgical History:   Procedure Laterality Date    ARTHROSCOPIC CHONDROPLASTY OF KNEE JOINT Left 1997    BACK SURGERY  2007    jerrell    BREAST BIOPSY Left 1996    ECHOCARDIOGRAPHY  2007    HYSTERECTOMY      laparascopic  1998    diagnostic to remove endometriosis    LAPAROSCOPY-ASSISTED VAGINAL HYSTERECTOMY N/A 9/17/2019    Procedure: HYSTERECTOMY, VAGINAL, LAPAROSCOPY-ASSISTED;  Surgeon: Isabella Turpin MD;  Location: [facility];  Service: OB/GYN;  Laterality: N/A;    stess test  2007     Family History   Problem Relation Age of Onset    Hypertension Mother      Social History     Tobacco Use    Smoking status: Every Day     Packs/day: 0.50     Years: 20.00     Pack years: 10.00     Types: Cigarettes    Smokeless  tobacco: Never   Substance Use Topics    Alcohol use: Yes     Comment: Occasionally    Drug use: No     Review of Systems   Constitutional:  Negative for chills and fever.   HENT:  Negative for congestion, sore throat and trouble swallowing.    Respiratory:  Negative for cough and shortness of breath.    Cardiovascular:  Negative for chest pain and palpitations.   Gastrointestinal:  Positive for abdominal pain and nausea. Negative for diarrhea and vomiting.   Genitourinary:  Negative for dysuria and flank pain.   Musculoskeletal:  Negative for back pain and neck pain.   Neurological:  Negative for weakness, numbness and headaches.   Psychiatric/Behavioral:  Negative for agitation and confusion.    All other systems reviewed and are negative.    Physical Exam     Initial Vitals [09/28/22 1922]   BP Pulse Resp Temp SpO2   101/77 86 18 98.8 °F (37.1 °C) 99 %      MAP       --         Physical Exam    Nursing note and vitals reviewed.  Constitutional: Vital signs are normal. She appears well-developed and well-nourished.  Non-toxic appearance. No distress.   HENT:   Head: Normocephalic and atraumatic.   Mouth/Throat: Oropharynx is clear and moist.   Eyes: Conjunctivae and EOM are normal. Pupils are equal, round, and reactive to light.   Neck: Neck supple.   Normal range of motion.  Cardiovascular:  Normal rate, regular rhythm and intact distal pulses.           Pulmonary/Chest: Breath sounds normal. She has no wheezes.   Abdominal: Abdomen is soft. Bowel sounds are normal. There is abdominal tenderness.   Diffuse abdominal tenderness to palpation   Musculoskeletal:         General: No tenderness or edema. Normal range of motion.      Cervical back: Normal range of motion and neck supple. No rigidity. No muscular tenderness.     Lymphadenopathy:     She has no cervical adenopathy.     She has no axillary adenopathy.   Neurological: She is alert and oriented to person, place, and time. She has normal strength. No cranial  nerve deficit or sensory deficit. Gait normal.   Skin: Skin is warm, dry and intact.   Psychiatric: She has a normal mood and affect. Her behavior is normal.       ED Course   Procedures  Labs Reviewed   CBC W/ AUTO DIFFERENTIAL - Abnormal; Notable for the following components:       Result Value    MCH 31.8 (*)     MPV 9.0 (*)     All other components within normal limits   COMPREHENSIVE METABOLIC PANEL   URINALYSIS, REFLEX TO URINE CULTURE    Narrative:     In and Out Cath as needed it patient unable to void  Specimen Source->Urine   LIPASE   LIPASE     EKG Readings: (Independently Interpreted)   EKG was independently reviewed by me.  Time: 10:21 p.m.  Rate:  80 beats per minute  Normal sinus rhythm.  No ST or T-wave abnormalities.  No priors.       Imaging Results              CT Chest Without Contrast (Final result)  Result time 09/28/22 22:39:36      Final result by Nader Vizcarra MD (09/28/22 22:39:36)                   Narrative:    EXAM:  CT Chest Without Intravenous Contrast  CLINICAL HISTORY:  46 years old, Female; ? esophageal rupture;  TECHNIQUE:  Axial computed tomography images of the chest without intravenous contrast.  This CT exam was performed using one or more of the following dose reduction techniques:  automated exposure control, adjustment of the mA and/or kV according to patient size, and/or use of iterative reconstruction technique.  COMPARISON:  June 28, 2020 chest x-ray    FINDINGS:  Lungs:  Dependent atelectasis.  Centrilobular emphysematous lung disease with bullous change along the spine bilaterally.  Right upper lobe granuloma.  Pleural space:  Unremarkable.  No pneumothorax.  No significant effusion.  Heart:  Unremarkable.  No cardiomegaly.  No significant pericardial effusion.  No significant coronary artery calcifications.  Mediastinum:  A few prominent mediastinal lymph nodes. No mediastinal emphysema.  Bones/joints:  Unremarkable.  No acute fracture.  No dislocation.  Soft  tissues:  Unremarkable.  Vasculature:  Unremarkable.  No thoracic aortic aneurysm.  Lymph nodes:  See above.    IMPRESSION:  1. Centrilobular emphysematous lung disease with bullous change along the lower thoracic spine bilaterally.  2. No definite secondary findings to suggest esophageal rupture. This can be confirmed with swallow study.    Electronically signed by:  Nader Vizcarra MD  9/28/2022 10:39 PM CDT Workstation: [id number]                                     X-Ray Chest PA And Lateral (In process)                      CT Abdomen Pelvis With Contrast (Edited Result - FINAL)  Result time 09/28/22 21:57:18      Edited Result - FINAL by Martine Lentz MD (09/28/22 21:57:18)                   Narrative:         ADDENDUM #1       Findings were discussed with Dr. Cueto over the telephone at 21:48 CST on 9/28/2022. She voiced understanding.    Electronically signed by:  Martine Lentz MD  9/28/2022 9:57 PM CDT Workstation: [id number]     ORIGINAL REPORT       CLINICAL INDICATION:  Abdominal pain, acute, nonlocalized    TECHNIQUE: Helical CT examination of the abdomen and pelvis was performed from the top of the domes of the diaphragm to the pubic symphysis with the administration of intravenous contrast material. Sagittal and coronal reconstructions were performed for review.    CONTRAST: 100 cc of Omnipaque 350, IV    COMPARISON: None    FINDINGS:  Lower Chest: Visualized lung bases are clear. Heart is normal in size. No pericardial or pleural effusion. Small pneumothoraces are seen adjacent to the distal esophagus/gastroesophageal junction.    Liver: Normal in size, contour, and attenuation.  Bile ducts: Normal caliber.  Gallbladder: Contracted.  Pancreas: Normal appearance without focal lesion.  Spleen: Normal in size and contour.  Adrenals: Normal configuration.  Kidneys and ureters: Normal size and contour. No hydronephrosis.  Bladder: Normal in appearance.    Vessels: Abdominal aorta and inferior  vena cava are normal in course and caliber.    Reproductive organs: Uterus is surgically absent. No adnexal mass.    Bowel: Loops of bowel without wall thickening or obstruction. Normal appendix.    Lymph nodes: No enlarged mesenteric or retroperitoneal lymph nodes.  Peritoneum: No free air, free fluid, or fluid collection.    Retroperitoneum: No mass or hemorrhage.  Abdominal wall: 1.3 cm fat-containing umbilical hernia.  Bones: No acute abnormality or suspicious bony lesion.    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      RADIATION DOSE REDUCTION: All CT scans are performed using radiation dose reduction techniques, when applicable. Technical factors are evaluated and adjusted to ensure appropriate moderation of exposure.    Electronically signed by:  Martine Lentz MD  9/28/2022 9:33 PM CDT Workstation: [id number]                      Final result by Martine Lentz MD (09/28/22 21:33:13)                   Narrative:    CLINICAL INDICATION:  Abdominal pain, acute, nonlocalized    TECHNIQUE: Helical CT examination of the abdomen and pelvis was performed from the top of the domes of the diaphragm to the pubic symphysis with the administration of intravenous contrast material. Sagittal and coronal reconstructions were performed for review.    CONTRAST: 100 cc of Omnipaque 350, IV    COMPARISON: None    FINDINGS:  Lower Chest: Visualized lung bases are clear. Heart is normal in size. No pericardial or pleural effusion. Small pneumothoraces are seen adjacent to the distal esophagus/gastroesophageal junction.    Liver: Normal in size, contour, and attenuation.  Bile ducts: Normal caliber.  Gallbladder: Contracted.  Pancreas: Normal appearance without focal lesion.  Spleen: Normal in size and contour.  Adrenals: Normal configuration.  Kidneys and ureters: Normal size and contour. No hydronephrosis.  Bladder: Normal in  appearance.    Vessels: Abdominal aorta and inferior vena cava are normal in course and caliber.    Reproductive organs: Uterus is surgically absent. No adnexal mass.    Bowel: Loops of bowel without wall thickening or obstruction. Normal appendix.    Lymph nodes: No enlarged mesenteric or retroperitoneal lymph nodes.  Peritoneum: No free air, free fluid, or fluid collection.    Retroperitoneum: No mass or hemorrhage.  Abdominal wall: 1.3 cm fat-containing umbilical hernia.  Bones: No acute abnormality or suspicious bony lesion.    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      RADIATION DOSE REDUCTION: All CT scans are performed using radiation dose reduction techniques, when applicable. Technical factors are evaluated and adjusted to ensure appropriate moderation of exposure.    Electronically signed by:  Martine Lentz MD  9/28/2022 9:33 PM CDT Workstation: [id number]                                     Medications   hyoscyamine injection 0.5 mg (0.5 mg Intravenous Given 9/28/22 2049)   famotidine (PF) injection 20 mg (20 mg Intravenous Given 9/28/22 2048)   ondansetron injection 4 mg (4 mg Intravenous Given 9/28/22 2055)   iohexoL (OMNIPAQUE 350) injection 100 mL (100 mLs Intravenous Given 9/28/22 2043)     Medical Decision Making:   Initial Assessment:   This is a 46-year-old female with no significant past medical history comes in complaining of abdominal pain and burning in her chest.  On examination vitals are stable.  On physical exam she does have diffuse abdominal tenderness to palpation as well as mild distension.  Exam is normal otherwise.    Orders included EKG, CBC, CMP, lipase, UA, CT abdomen and pelvis.  She was hydrated.  She was given IV Pepcid.  Differential Diagnosis:   Gastritis, pancreatitis, cholelithiasis, cholecystitis, appendicitis, GERD, peptic ulcer disease.  Independently Interpreted Test(s):    I have ordered and independently interpreted X-rays - see summary below.       <> Summary of X-Ray Reading(s): CT abdomen and pelvis was reviewed with the radiologist.  It was concerning for air around the esophagus.  Clinical Tests:   Lab Tests: Ordered and Reviewed       <> Summary of Lab: Labs were independently reviewed by me are unremarkable.  ED Management:  Patient's CT abdomen and pelvis was concerning for possible Boerhaave.  A dedicated CT chest was obtained.  It did not show any evidence of esophageal rupture.  There were emphysematous bullous changes that likely gave the appearance on CT abdomen.  On repeat re-evaluation patient is nontoxic appearing and asymptomatic.  She will be discharged in good condition with close outpatient follow-up.  Her symptoms are consistent with gastritis.  She was started on a PPI.  She is to return to the ER for any concerns.                        Clinical Impression:   Final diagnoses:  [R10.9] Abdominal pain  [K29.00] Acute gastritis without hemorrhage, unspecified gastritis type (Primary)        ED Disposition Condition    Discharge Stable          ED Prescriptions       Medication Sig Dispense Start Date End Date Auth. Provider    esomeprazole (NEXIUM) 40 MG capsule Take 1 capsule (40 mg total) by mouth once daily. 30 capsule 9/28/2022 10/28/2022 Charley Cueto MD          Follow-up Information       Follow up With Specialties Details Why Contact Info    Pili Aguirre MD Family Medicine In 2 days  [address]  [address]  [facility]  [place] [address]  [phone]               Charley Cueto MD  09/28/22 0756

## 2022-11-09 NOTE — TELEPHONE ENCOUNTER
Procedure confirmed with patient. Aware of 1100 arrival time, where to arrive and  policy. Covid screening completed. No questions at this time. Spoke with pt - Pt informed of her u/s results per Dr. Aguirre verbatim. Pt verbalized an understanding. FRANCESCO

## 2022-12-07 ENCOUNTER — OFFICE VISIT (OUTPATIENT)
Dept: FAMILY MEDICINE | Facility: CLINIC | Age: 47
End: 2022-12-07
Payer: COMMERCIAL

## 2022-12-07 VITALS
HEART RATE: 91 BPM | HEIGHT: 66 IN | OXYGEN SATURATION: 95 % | DIASTOLIC BLOOD PRESSURE: 68 MMHG | WEIGHT: 205 LBS | SYSTOLIC BLOOD PRESSURE: 106 MMHG | BODY MASS INDEX: 32.95 KG/M2

## 2022-12-07 DIAGNOSIS — Z53.21 PATIENT LEFT WITHOUT BEING SEEN: Primary | ICD-10-CM

## 2022-12-07 PROCEDURE — 3078F PR MOST RECENT DIASTOLIC BLOOD PRESSURE < 80 MM HG: ICD-10-PCS | Mod: CPTII,S$GLB,, | Performed by: FAMILY MEDICINE

## 2022-12-07 PROCEDURE — 1159F MED LIST DOCD IN RCRD: CPT | Mod: CPTII,S$GLB,, | Performed by: FAMILY MEDICINE

## 2022-12-07 PROCEDURE — 1160F RVW MEDS BY RX/DR IN RCRD: CPT | Mod: CPTII,S$GLB,, | Performed by: FAMILY MEDICINE

## 2022-12-07 PROCEDURE — 1159F PR MEDICATION LIST DOCUMENTED IN MEDICAL RECORD: ICD-10-PCS | Mod: CPTII,S$GLB,, | Performed by: FAMILY MEDICINE

## 2022-12-07 PROCEDURE — 3074F SYST BP LT 130 MM HG: CPT | Mod: CPTII,S$GLB,, | Performed by: FAMILY MEDICINE

## 2022-12-07 PROCEDURE — 1160F PR REVIEW ALL MEDS BY PRESCRIBER/CLIN PHARMACIST DOCUMENTED: ICD-10-PCS | Mod: CPTII,S$GLB,, | Performed by: FAMILY MEDICINE

## 2022-12-07 PROCEDURE — 3008F BODY MASS INDEX DOCD: CPT | Mod: CPTII,S$GLB,, | Performed by: FAMILY MEDICINE

## 2022-12-07 PROCEDURE — 3078F DIAST BP <80 MM HG: CPT | Mod: CPTII,S$GLB,, | Performed by: FAMILY MEDICINE

## 2022-12-07 PROCEDURE — 3008F PR BODY MASS INDEX (BMI) DOCUMENTED: ICD-10-PCS | Mod: CPTII,S$GLB,, | Performed by: FAMILY MEDICINE

## 2022-12-07 PROCEDURE — 3074F PR MOST RECENT SYSTOLIC BLOOD PRESSURE < 130 MM HG: ICD-10-PCS | Mod: CPTII,S$GLB,, | Performed by: FAMILY MEDICINE

## 2023-04-11 ENCOUNTER — PATIENT MESSAGE (OUTPATIENT)
Dept: ADMINISTRATIVE | Facility: HOSPITAL | Age: 48
End: 2023-04-11
Payer: COMMERCIAL

## 2023-08-09 ENCOUNTER — OFFICE VISIT (OUTPATIENT)
Dept: FAMILY MEDICINE | Facility: CLINIC | Age: 48
End: 2023-08-09
Payer: COMMERCIAL

## 2023-08-09 VITALS
BODY MASS INDEX: 31.02 KG/M2 | WEIGHT: 193 LBS | SYSTOLIC BLOOD PRESSURE: 124 MMHG | DIASTOLIC BLOOD PRESSURE: 76 MMHG | HEART RATE: 90 BPM | HEIGHT: 66 IN | OXYGEN SATURATION: 98 %

## 2023-08-09 DIAGNOSIS — F33.9 RECURRENT MAJOR DEPRESSIVE DISORDER, REMISSION STATUS UNSPECIFIED: Primary | ICD-10-CM

## 2023-08-09 DIAGNOSIS — F41.9 ANXIETY: ICD-10-CM

## 2023-08-09 DIAGNOSIS — Z79.899 LONG-TERM USE OF HIGH-RISK MEDICATION: ICD-10-CM

## 2023-08-09 DIAGNOSIS — Z12.31 SCREENING MAMMOGRAM, ENCOUNTER FOR: ICD-10-CM

## 2023-08-09 DIAGNOSIS — G43.709 CHRONIC MIGRAINE WITHOUT AURA WITHOUT STATUS MIGRAINOSUS, NOT INTRACTABLE: ICD-10-CM

## 2023-08-09 DIAGNOSIS — M54.50 CHRONIC RIGHT-SIDED LOW BACK PAIN WITHOUT SCIATICA: ICD-10-CM

## 2023-08-09 DIAGNOSIS — N81.10 BLADDER PROLAPSE, FEMALE, ACQUIRED: ICD-10-CM

## 2023-08-09 DIAGNOSIS — G89.29 CHRONIC RIGHT-SIDED LOW BACK PAIN WITHOUT SCIATICA: ICD-10-CM

## 2023-08-09 DIAGNOSIS — J30.9 ALLERGIC RHINITIS, UNSPECIFIED SEASONALITY, UNSPECIFIED TRIGGER: ICD-10-CM

## 2023-08-09 PROCEDURE — 3078F PR MOST RECENT DIASTOLIC BLOOD PRESSURE < 80 MM HG: ICD-10-PCS | Mod: CPTII,S$GLB,, | Performed by: FAMILY MEDICINE

## 2023-08-09 PROCEDURE — 3074F SYST BP LT 130 MM HG: CPT | Mod: CPTII,S$GLB,, | Performed by: FAMILY MEDICINE

## 2023-08-09 PROCEDURE — 99214 OFFICE O/P EST MOD 30 MIN: CPT | Mod: S$GLB,,, | Performed by: FAMILY MEDICINE

## 2023-08-09 PROCEDURE — 1160F RVW MEDS BY RX/DR IN RCRD: CPT | Mod: CPTII,S$GLB,, | Performed by: FAMILY MEDICINE

## 2023-08-09 PROCEDURE — 3008F BODY MASS INDEX DOCD: CPT | Mod: CPTII,S$GLB,, | Performed by: FAMILY MEDICINE

## 2023-08-09 PROCEDURE — 1159F PR MEDICATION LIST DOCUMENTED IN MEDICAL RECORD: ICD-10-PCS | Mod: CPTII,S$GLB,, | Performed by: FAMILY MEDICINE

## 2023-08-09 PROCEDURE — 99214 PR OFFICE/OUTPT VISIT, EST, LEVL IV, 30-39 MIN: ICD-10-PCS | Mod: S$GLB,,, | Performed by: FAMILY MEDICINE

## 2023-08-09 PROCEDURE — 3078F DIAST BP <80 MM HG: CPT | Mod: CPTII,S$GLB,, | Performed by: FAMILY MEDICINE

## 2023-08-09 PROCEDURE — 1159F MED LIST DOCD IN RCRD: CPT | Mod: CPTII,S$GLB,, | Performed by: FAMILY MEDICINE

## 2023-08-09 PROCEDURE — 3008F PR BODY MASS INDEX (BMI) DOCUMENTED: ICD-10-PCS | Mod: CPTII,S$GLB,, | Performed by: FAMILY MEDICINE

## 2023-08-09 PROCEDURE — 1160F PR REVIEW ALL MEDS BY PRESCRIBER/CLIN PHARMACIST DOCUMENTED: ICD-10-PCS | Mod: CPTII,S$GLB,, | Performed by: FAMILY MEDICINE

## 2023-08-09 PROCEDURE — 3074F PR MOST RECENT SYSTOLIC BLOOD PRESSURE < 130 MM HG: ICD-10-PCS | Mod: CPTII,S$GLB,, | Performed by: FAMILY MEDICINE

## 2023-08-09 RX ORDER — FLUTICASONE PROPIONATE 50 MCG
1 SPRAY, SUSPENSION (ML) NASAL DAILY
Qty: 18.2 ML | Refills: 3 | Status: SHIPPED | OUTPATIENT
Start: 2023-08-09 | End: 2023-12-11 | Stop reason: SDUPTHER

## 2023-08-09 RX ORDER — DESVENLAFAXINE SUCCINATE 50 MG/1
50 TABLET, EXTENDED RELEASE ORAL DAILY
Qty: 30 TABLET | Refills: 11 | Status: SHIPPED | OUTPATIENT
Start: 2023-08-09 | End: 2024-08-08

## 2023-08-09 RX ORDER — METHOCARBAMOL 750 MG/1
750 TABLET, FILM COATED ORAL 2 TIMES DAILY PRN
Qty: 60 TABLET | Refills: 5 | Status: SHIPPED | OUTPATIENT
Start: 2023-08-09

## 2023-08-09 RX ORDER — HYDROCODONE BITARTRATE AND ACETAMINOPHEN 5; 325 MG/1; MG/1
1 TABLET ORAL EVERY 8 HOURS PRN
Qty: 21 TABLET | Refills: 0 | Status: SHIPPED | OUTPATIENT
Start: 2023-08-09 | End: 2023-12-11 | Stop reason: SDUPTHER

## 2023-08-09 NOTE — PROGRESS NOTES
SUBJECTIVE:    Patient ID: Becky Sy is a 47 y.o. female.    Chief Complaint: Routine Check Up (No bottles)    Pt seen to checkup on acute and chronic conditions.    Pt has not been seen in about 1 yr.     Has lost some weight, climbing mountains.   Has been working doing [employer]. Has been riding her bike for exercise.    Has been stressed some. Now taking pristiq, which is doing better than effexor. Doesn't get sad when bad things happen. Still able to cry.  Still taking xanax three times daily. (Failed effexor, luvox).  Not taking THC drops. No trouble sleeping when she sleeps.    Every since she has had problem with her bladder since her hysterectomy. Feels like her bladder is falling out. Her bladder hurts.     Has been smoking less. Has cut down from 1ppd to 1/2ppd.     Has had 2 migraines this week. Took ubrelvy that helped. No longer taking topamax. Takes tylenol if needed.  No longer having tingling in the fingers.     No recent labs.    Her back hurts when she wakes up. Hurt her back this week picking up her  62 pound daughter to get something off the top shelf. 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    ------------------------------------------------------  Mammogram UTD 11/2020, due, ordered, but has not done due to issues with her insurance. Not having any trouble with her breast, just has a chronic pimple on the right breast that [facility] did not want to do her mammogram for.  Cscope 7/2022 ([facility]), 3 polyps, to repeat in 3 months.            Past Medical History:   Diagnosis Date    Allergy     Anxiety     Anxiety 1994    on meds    Arthritis 1990    knees miley, miley thumbs    Depression     Fibroids 09/03/2019    Dr Turpin    Menorrhagia 09/03/2019    Dr Turpin    V-tach 2007    resolved itself     Social History     Socioeconomic History    Marital status:    Tobacco Use    Smoking status: Every Day      Current packs/day: 0.50     Average packs/day: 0.5 packs/day for 20.0 years (10.0 ttl pk-yrs)     Types: Cigarettes    Smokeless tobacco: Never   Substance and Sexual Activity    Alcohol use: Yes     Comment: Occasionally    Drug use: No    Sexual activity: Yes     Partners: Male     Social Determinants of Health     Financial Resource Strain: High Risk (11/11/2020)    Overall Financial Resource Strain (CARDIA)     Difficulty of Paying Living Expenses: Hard   Food Insecurity: Food Insecurity Present (11/11/2020)    Hunger Vital Sign     Worried About Running Out of Food in the Last Year: Often true     Ran Out of Food in the Last Year: Sometimes true   Transportation Needs: No Transportation Needs (11/11/2020)    PRAPARE - Transportation     Lack of Transportation (Medical): No     Lack of Transportation (Non-Medical): No   Physical Activity: Insufficiently Active (11/11/2020)    Exercise Vital Sign     Days of Exercise per Week: 3 days     Minutes of Exercise per Session: 20 min   Stress: Stress Concern Present (11/13/2019)    [employer] - Occupational Stress Questionnaire     Feeling of Stress : To some extent   Social Connections: Unknown (11/11/2020)    Social Connection and Isolation Panel [NHANES]     Frequency of Communication with Friends and Family: More than three times a week     Frequency of Social Gatherings with Friends and Family: Three times a week     Active Member of Clubs or Organizations: No     Attends Club or Organization Meetings: Never     Marital Status:      Past Surgical History:   Procedure Laterality Date    ARTHROSCOPIC CHONDROPLASTY OF KNEE JOINT Left 1997    BACK SURGERY  2007    jerrell    BREAST BIOPSY Left 1996    ECHOCARDIOGRAPHY  2007    HYSTERECTOMY      laparascopic  1998    diagnostic to remove endometriosis    LAPAROSCOPY-ASSISTED VAGINAL HYSTERECTOMY N/A 9/17/2019    Procedure: HYSTERECTOMY, VAGINAL, LAPAROSCOPY-ASSISTED;  Surgeon:  Isabella Turpin MD;  Location: [facility] OR;  Service: OB/GYN;  Laterality: N/A;    stess test  2007     Family History   Problem Relation Age of Onset    Hypertension Mother        Review of patient's allergies indicates:  No Known Allergies    Current Outpatient Medications:     albuterol (PROVENTIL/VENTOLIN HFA) 90 mcg/actuation inhaler, , Disp: , Rfl:     ALPRAZolam (XANAX) 0.5 MG tablet, Take 1 tablet (0.5 mg total) by mouth 3 (three) times daily., Disp: 270 tablet, Rfl: 0    azelastine (ASTELIN) 137 mcg (0.1 %) nasal spray, SPRAY 1 SPRAY IN EACH NOSTRIL 2 TIMES PER DAY, Disp: , Rfl:     cetirizine (ZYRTEC) 10 MG tablet, , Disp: , Rfl:     desvenlafaxine succinate (PRISTIQ) 50 MG Tb24, Take 1 tablet (50 mg total) by mouth once daily., Disp: 30 tablet, Rfl: 11    fluticasone propionate (FLONASE) 50 mcg/actuation nasal spray, 1 spray (50 mcg total) by Each Nostril route once daily., Disp: 18.2 mL, Rfl: 3    HYDROcodone-acetaminophen (NORCO) 5-325 mg per tablet, Take 1 tablet by mouth every 8 (eight) hours as needed for Pain., Disp: 21 tablet, Rfl: 0    meclizine (ANTIVERT) 25 mg tablet, Take 1 tablet (25 mg total) by mouth 3 (three) times daily as needed for Dizziness., Disp: 90 tablet, Rfl: 0    methocarbamoL (ROBAXIN) 750 MG Tab, Take 1 tablet (750 mg total) by mouth 2 (two) times daily as needed (spasm)., Disp: 60 tablet, Rfl: 5    multivitamin capsule, Take 1 capsule by mouth once daily., Disp: , Rfl:     UBRELVY 50 mg tablet, Take 1 tablet (50 mg total) by mouth daily as needed for Migraine., Disp: 10 tablet, Rfl: 2    Review of Systems   Constitutional:  Negative for appetite change, fatigue, fever and unexpected weight change.   Respiratory:  Negative for cough, chest tightness, shortness of breath and wheezing.    Cardiovascular:  Negative for chest pain and leg swelling.   Gastrointestinal:  Negative for abdominal pain, constipation, nausea and vomiting.        -heartburn   Genitourinary:  Negative for  "difficulty urinating, dysuria, frequency and urgency.        +pain with sex, due to bladder issue   Musculoskeletal:  Negative for arthralgias, back pain, myalgias and neck pain.   Skin:  Negative for rash.   Neurological:  Negative for dizziness, weakness, numbness and headaches.   Hematological:  Does not bruise/bleed easily.   Psychiatric/Behavioral:  Negative for dysphoric mood, sleep disturbance and suicidal ideas. The patient is not nervous/anxious.    All other systems reviewed and are negative.         Objective:      Vitals:    08/09/23 1122   BP: 124/76   Pulse: 90   SpO2: 98%   Weight: 87.5 kg (193 lb)   Height: 5' 6" (1.676 m)     Wt Readings from Last 3 Encounters:   08/09/23 87.5 kg (193 lb)   02/06/23 93.3 kg (205 lb 11 oz)   12/07/22 93 kg (205 lb)       Physical Exam  Vitals reviewed.   Constitutional:       General: She is not in acute distress.     Appearance: Normal appearance. She is well-developed.   HENT:      Head: Normocephalic and atraumatic.   Neck:      Thyroid: No thyromegaly.   Cardiovascular:      Rate and Rhythm: Normal rate and regular rhythm.      Heart sounds: Normal heart sounds. No murmur heard.     No friction rub.   Pulmonary:      Effort: Pulmonary effort is normal.      Breath sounds: Normal breath sounds. No wheezing or rales.   Abdominal:      General: Bowel sounds are normal. There is no distension.      Palpations: Abdomen is soft.      Tenderness: There is no abdominal tenderness.   Musculoskeletal:      Cervical back: Neck supple.   Lymphadenopathy:      Cervical: No cervical adenopathy.   Skin:     General: Skin is warm and dry.      Findings: No rash.   Neurological:      Mental Status: She is alert and oriented to person, place, and time.   Psychiatric:         Attention and Perception: She is attentive.         Speech: Speech normal.         Behavior: Behavior normal.         Thought Content: Thought content normal.         Judgment: Judgment normal.       "     Assessment:       1. Recurrent major depressive disorder, remission status unspecified    2. Chronic migraine without aura without status migrainosus, not intractable    3. Anxiety    4. Chronic right-sided low back pain without sciatica    5. Allergic rhinitis, unspecified seasonality, unspecified trigger    6. Screening mammogram, encounter for    7. Bladder prolapse, female, acquired    8. Long-term use of high-risk medication         Plan:       Recurrent major depressive disorder, remission status unspecified  Comments:  Controlled. Will continue to monitor symptoms.  Orders:  -     desvenlafaxine succinate (PRISTIQ) 50 MG Tb24; Take 1 tablet (50 mg total) by mouth once daily.  Dispense: 30 tablet; Refill: 11    Chronic migraine without aura without status migrainosus, not intractable  Comments:  Chronic. Will continue to monitor frequency and tolerability of headaches    Anxiety  Comments:  Chronic. Will continue to monitor symptoms.    Chronic right-sided low back pain without sciatica  Comments:  Intermittent. Pt given short rx for exacerbations.   Orders:  -     HYDROcodone-acetaminophen (NORCO) 5-325 mg per tablet; Take 1 tablet by mouth every 8 (eight) hours as needed for Pain.  Dispense: 21 tablet; Refill: 0  -     methocarbamoL (ROBAXIN) 750 MG Tab; Take 1 tablet (750 mg total) by mouth 2 (two) times daily as needed (spasm).  Dispense: 60 tablet; Refill: 5    Allergic rhinitis, unspecified seasonality, unspecified trigger  Comments:  Controlled. To continue flonase. Will continue to monitor symptoms.  Orders:  -     fluticasone propionate (FLONASE) 50 mcg/actuation nasal spray; 1 spray (50 mcg total) by Each Nostril route once daily.  Dispense: 18.2 mL; Refill: 3    Screening mammogram, encounter for  Comments:  Order sent to [facility]  Orders:  -     Mammo Digital Screening Bilat w/ Edwar; Future; Expected date: 08/09/2023    Bladder prolapse, female, acquired  -     Ambulatory referral/consult to  Urogynecology; Future; Expected date: 08/16/2023    Long-term use of high-risk medication  -     Lipid Panel; Future; Expected date: 08/09/2023  -     Comprehensive Metabolic Panel; Future; Expected date: 08/09/2023  -     CBC Auto Differential; Future; Expected date: 08/09/2023  -     Urinalysis, Reflex to Urine Culture Urine, Clean Catch; Future; Expected date: 08/09/2023  -     TSH w/reflex to FT4; Future; Expected date: 08/09/2023      Labs and/or tests have been ordered for the evaluation/monitoring of acute/chronic conditions, to be done just before next visit.    Follow up in about 4 months (around 12/9/2023) for Depression, Migraines.        8/9/2023 Pili Aguirre

## 2023-08-14 ENCOUNTER — HOSPITAL ENCOUNTER (OUTPATIENT)
Dept: RADIOLOGY | Facility: HOSPITAL | Age: 48
Discharge: HOME | End: 2023-08-14
Attending: FAMILY MEDICINE
Payer: COMMERCIAL

## 2023-08-14 ENCOUNTER — PATIENT MESSAGE (OUTPATIENT)
Dept: FAMILY MEDICINE | Facility: CLINIC | Age: 48
End: 2023-08-14

## 2023-08-14 ENCOUNTER — TELEPHONE (OUTPATIENT)
Dept: FAMILY MEDICINE | Facility: CLINIC | Age: 48
End: 2023-08-14

## 2023-08-14 VITALS — WEIGHT: 192.88 LBS | HEIGHT: 66 IN | BODY MASS INDEX: 31 KG/M2

## 2023-08-14 DIAGNOSIS — R39.9 UTI SYMPTOMS: ICD-10-CM

## 2023-08-14 DIAGNOSIS — M54.50 CHRONIC RIGHT-SIDED LOW BACK PAIN WITHOUT SCIATICA: Primary | ICD-10-CM

## 2023-08-14 DIAGNOSIS — Z12.31 SCREENING MAMMOGRAM, ENCOUNTER FOR: ICD-10-CM

## 2023-08-14 DIAGNOSIS — G89.29 CHRONIC RIGHT-SIDED LOW BACK PAIN WITHOUT SCIATICA: Primary | ICD-10-CM

## 2023-08-14 LAB
ALBUMIN SERPL-MCNC: 3.8 G/DL (ref 3.6–5.1)
ALBUMIN/GLOB SERPL: 1.8 (CALC) (ref 1–2.5)
ALP SERPL-CCNC: 60 U/L (ref 31–125)
ALT SERPL-CCNC: 12 U/L (ref 6–29)
APPEARANCE UR: CLEAR
AST SERPL-CCNC: 12 U/L (ref 10–35)
BACTERIA #/AREA URNS HPF: ABNORMAL /HPF
BACTERIA UR CULT: ABNORMAL
BACTERIA UR CULT: ABNORMAL
BASOPHILS # BLD AUTO: 12 CELLS/UL (ref 0–200)
BASOPHILS NFR BLD AUTO: 0.2 %
BILIRUB SERPL-MCNC: 0.4 MG/DL (ref 0.2–1.2)
BILIRUB UR QL STRIP: NEGATIVE
BUN SERPL-MCNC: 8 MG/DL (ref 7–25)
BUN/CREAT SERPL: ABNORMAL (CALC) (ref 6–22)
CALCIUM SERPL-MCNC: 8.8 MG/DL (ref 8.6–10.2)
CHLORIDE SERPL-SCNC: 105 MMOL/L (ref 98–110)
CHOLEST SERPL-MCNC: 198 MG/DL
CHOLEST/HDLC SERPL: 4.2 (CALC)
CO2 SERPL-SCNC: 28 MMOL/L (ref 20–32)
COLOR UR: YELLOW
CREAT SERPL-MCNC: 0.8 MG/DL (ref 0.5–0.99)
EGFR: 92 ML/MIN/1.73M2
EOSINOPHIL # BLD AUTO: 12 CELLS/UL (ref 15–500)
EOSINOPHIL NFR BLD AUTO: 0.2 %
ERYTHROCYTE [DISTWIDTH] IN BLOOD BY AUTOMATED COUNT: 12.6 % (ref 11–15)
GLOBULIN SER CALC-MCNC: 2.1 G/DL (CALC) (ref 1.9–3.7)
GLUCOSE SERPL-MCNC: 100 MG/DL (ref 65–99)
GLUCOSE UR QL STRIP: NEGATIVE
HCT VFR BLD AUTO: 41 % (ref 35–45)
HDLC SERPL-MCNC: 47 MG/DL
HGB BLD-MCNC: 13.6 G/DL (ref 11.7–15.5)
HGB UR QL STRIP: NEGATIVE
HYALINE CASTS #/AREA URNS LPF: ABNORMAL /LPF
KETONES UR QL STRIP: NEGATIVE
LDLC SERPL CALC-MCNC: 129 MG/DL (CALC)
LEUKOCYTE ESTERASE UR QL STRIP: ABNORMAL
LYMPHOCYTES # BLD AUTO: 1723 CELLS/UL (ref 850–3900)
LYMPHOCYTES NFR BLD AUTO: 29.7 %
MCH RBC QN AUTO: 31.5 PG (ref 27–33)
MCHC RBC AUTO-ENTMCNC: 33.2 G/DL (ref 32–36)
MCV RBC AUTO: 94.9 FL (ref 80–100)
MONOCYTES # BLD AUTO: 267 CELLS/UL (ref 200–950)
MONOCYTES NFR BLD AUTO: 4.6 %
NEUTROPHILS # BLD AUTO: 3787 CELLS/UL (ref 1500–7800)
NEUTROPHILS NFR BLD AUTO: 65.3 %
NITRITE UR QL STRIP: NEGATIVE
NONHDLC SERPL-MCNC: 151 MG/DL (CALC)
PH UR STRIP: 6 [PH] (ref 5–8)
PLATELET # BLD AUTO: 302 THOUSAND/UL (ref 140–400)
PMV BLD REES-ECKER: 8.8 FL (ref 7.5–12.5)
POTASSIUM SERPL-SCNC: 4 MMOL/L (ref 3.5–5.3)
PROT SERPL-MCNC: 5.9 G/DL (ref 6.1–8.1)
PROT UR QL STRIP: NEGATIVE
RBC # BLD AUTO: 4.32 MILLION/UL (ref 3.8–5.1)
RBC #/AREA URNS HPF: ABNORMAL /HPF
SERVICE CMNT-IMP: ABNORMAL
SODIUM SERPL-SCNC: 141 MMOL/L (ref 135–146)
SP GR UR STRIP: 1.02 (ref 1–1.03)
SQUAMOUS #/AREA URNS HPF: ABNORMAL /HPF
TRIGL SERPL-MCNC: 114 MG/DL
TSH SERPL-ACNC: 1.87 MIU/L
WBC # BLD AUTO: 5.8 THOUSAND/UL (ref 3.8–10.8)
WBC #/AREA URNS HPF: ABNORMAL /HPF

## 2023-08-14 PROCEDURE — 77067 SCR MAMMO BI INCL CAD: CPT | Mod: TC,PO

## 2023-08-14 RX ORDER — METHYLPREDNISOLONE 4 MG/1
TABLET ORAL
Qty: 21 EACH | Refills: 0 | Status: SHIPPED | OUTPATIENT
Start: 2023-08-14 | End: 2023-09-04

## 2023-08-14 RX ORDER — NITROFURANTOIN 25; 75 MG/1; MG/1
100 CAPSULE ORAL 2 TIMES DAILY
Qty: 10 CAPSULE | Refills: 0 | Status: SHIPPED | OUTPATIENT
Start: 2023-08-14 | End: 2023-12-11

## 2023-08-14 NOTE — TELEPHONE ENCOUNTER
----- Message from [facility], RT sent at 8/14/2023 11:10 AM CDT -----  UTI on culture

## 2023-08-22 ENCOUNTER — TELEPHONE (OUTPATIENT)
Dept: FAMILY MEDICINE | Facility: CLINIC | Age: 48
End: 2023-08-22

## 2023-09-05 ENCOUNTER — TELEPHONE (OUTPATIENT)
Dept: FAMILY MEDICINE | Facility: CLINIC | Age: 48
End: 2023-09-05

## 2023-09-05 NOTE — TELEPHONE ENCOUNTER
----- Message from Shanda Munoz sent at 9/5/2023 11:17 AM CDT -----  PA for desvenlafaxine. Thank you.

## 2023-10-03 DIAGNOSIS — M54.50 CHRONIC RIGHT-SIDED LOW BACK PAIN WITHOUT SCIATICA: ICD-10-CM

## 2023-10-03 DIAGNOSIS — G89.29 CHRONIC RIGHT-SIDED LOW BACK PAIN WITHOUT SCIATICA: ICD-10-CM

## 2023-10-03 DIAGNOSIS — G43.709 CHRONIC MIGRAINE WITHOUT AURA WITHOUT STATUS MIGRAINOSUS, NOT INTRACTABLE: ICD-10-CM

## 2023-10-03 DIAGNOSIS — F41.9 ANXIETY: ICD-10-CM

## 2023-10-03 RX ORDER — UBROGEPANT 50 MG/1
50 TABLET ORAL DAILY PRN
Qty: 10 TABLET | Refills: 2 | Status: SHIPPED | OUTPATIENT
Start: 2023-10-03

## 2023-10-03 RX ORDER — ALPRAZOLAM 0.5 MG/1
0.5 TABLET ORAL 3 TIMES DAILY
Qty: 270 TABLET | Refills: 0 | Status: SHIPPED | OUTPATIENT
Start: 2023-10-03 | End: 2024-01-04 | Stop reason: SDUPTHER

## 2023-10-03 RX ORDER — HYDROCODONE BITARTRATE AND ACETAMINOPHEN 5; 325 MG/1; MG/1
1 TABLET ORAL EVERY 8 HOURS PRN
Qty: 21 TABLET | Refills: 0 | Status: CANCELLED | OUTPATIENT
Start: 2023-10-03

## 2023-10-04 NOTE — TELEPHONE ENCOUNTER
Let pt know the she may need to do some therapy to help her back or see a specialist because the goal is to not have to take the pain med regularly. She may need to try a light muscle relaxer as an alternative.

## 2023-10-30 NOTE — Clinical Note
Left without being seen at her last appointment in December.  Please call to reschedule patient is to is possible No

## 2024-06-14 ENCOUNTER — OFFICE VISIT (OUTPATIENT)
Dept: FAMILY MEDICINE | Facility: CLINIC | Age: 49
End: 2024-06-14
Payer: COMMERCIAL

## 2024-06-14 VITALS
HEART RATE: 88 BPM | DIASTOLIC BLOOD PRESSURE: 60 MMHG | OXYGEN SATURATION: 98 % | WEIGHT: 174 LBS | HEIGHT: 66 IN | SYSTOLIC BLOOD PRESSURE: 90 MMHG | BODY MASS INDEX: 27.97 KG/M2

## 2024-06-14 DIAGNOSIS — F41.9 ANXIETY: Primary | ICD-10-CM

## 2024-06-14 DIAGNOSIS — Z79.899 LONG-TERM USE OF HIGH-RISK MEDICATION: ICD-10-CM

## 2024-06-14 DIAGNOSIS — G43.709 CHRONIC MIGRAINE WITHOUT AURA WITHOUT STATUS MIGRAINOSUS, NOT INTRACTABLE: ICD-10-CM

## 2024-06-14 DIAGNOSIS — M54.50 CHRONIC RIGHT-SIDED LOW BACK PAIN WITHOUT SCIATICA: ICD-10-CM

## 2024-06-14 DIAGNOSIS — J30.9 ALLERGIC RHINITIS, UNSPECIFIED SEASONALITY, UNSPECIFIED TRIGGER: ICD-10-CM

## 2024-06-14 DIAGNOSIS — G89.29 CHRONIC RIGHT-SIDED LOW BACK PAIN WITHOUT SCIATICA: ICD-10-CM

## 2024-06-14 DIAGNOSIS — Z12.31 SCREENING MAMMOGRAM FOR HIGH-RISK PATIENT: ICD-10-CM

## 2024-06-14 PROCEDURE — 3078F DIAST BP <80 MM HG: CPT | Mod: CPTII,S$GLB,, | Performed by: FAMILY MEDICINE

## 2024-06-14 PROCEDURE — 1159F MED LIST DOCD IN RCRD: CPT | Mod: CPTII,S$GLB,, | Performed by: FAMILY MEDICINE

## 2024-06-14 PROCEDURE — 99214 OFFICE O/P EST MOD 30 MIN: CPT | Mod: S$GLB,,, | Performed by: FAMILY MEDICINE

## 2024-06-14 PROCEDURE — 3008F BODY MASS INDEX DOCD: CPT | Mod: CPTII,S$GLB,, | Performed by: FAMILY MEDICINE

## 2024-06-14 PROCEDURE — 1160F RVW MEDS BY RX/DR IN RCRD: CPT | Mod: CPTII,S$GLB,, | Performed by: FAMILY MEDICINE

## 2024-06-14 PROCEDURE — 3074F SYST BP LT 130 MM HG: CPT | Mod: CPTII,S$GLB,, | Performed by: FAMILY MEDICINE

## 2024-06-14 RX ORDER — HYDROCODONE BITARTRATE AND ACETAMINOPHEN 5; 325 MG/1; MG/1
1 TABLET ORAL EVERY 8 HOURS PRN
Qty: 21 TABLET | Refills: 0 | Status: SHIPPED | OUTPATIENT
Start: 2024-06-14

## 2024-06-20 PROBLEM — M25.572 LEFT ANKLE PAIN: Status: ACTIVE | Noted: 2024-06-20

## 2024-06-20 PROBLEM — R29.898 WEAKNESS OF LEFT LOWER EXTREMITY: Status: ACTIVE | Noted: 2024-06-20

## 2024-06-20 PROBLEM — M25.672 DECREASED RANGE OF MOTION OF LEFT ANKLE: Status: ACTIVE | Noted: 2024-06-20

## 2024-08-15 ENCOUNTER — HOSPITAL ENCOUNTER (OUTPATIENT)
Dept: RADIOLOGY | Facility: HOSPITAL | Age: 49
Discharge: HOME | End: 2024-08-15
Attending: FAMILY MEDICINE
Payer: COMMERCIAL

## 2024-08-15 DIAGNOSIS — Z12.31 SCREENING MAMMOGRAM FOR HIGH-RISK PATIENT: ICD-10-CM

## 2024-08-15 PROCEDURE — 77067 SCR MAMMO BI INCL CAD: CPT | Mod: 26,,, | Performed by: RADIOLOGY

## 2024-08-15 PROCEDURE — 77063 BREAST TOMOSYNTHESIS BI: CPT | Mod: 26,,, | Performed by: RADIOLOGY

## 2024-08-15 PROCEDURE — 77067 SCR MAMMO BI INCL CAD: CPT | Mod: TC,PO

## 2024-10-01 ENCOUNTER — TELEPHONE (OUTPATIENT)
Dept: FAMILY MEDICINE | Facility: CLINIC | Age: 49
End: 2024-10-01
Payer: COMMERCIAL

## 2025-02-26 DIAGNOSIS — F41.9 ANXIETY: ICD-10-CM

## 2025-02-26 DIAGNOSIS — Z76.0 MEDICATION REFILL: ICD-10-CM

## 2025-02-26 RX ORDER — FLUTICASONE PROPIONATE 50 MCG
1 SPRAY, SUSPENSION (ML) NASAL DAILY
Qty: 18.2 ML | Refills: 3 | Status: SHIPPED | OUTPATIENT
Start: 2025-02-26

## 2025-02-26 RX ORDER — ALPRAZOLAM 0.5 MG/1
0.5 TABLET ORAL 3 TIMES DAILY
Qty: 270 TABLET | Refills: 0 | Status: SHIPPED | OUTPATIENT
Start: 2025-02-26

## 2025-02-27 NOTE — TELEPHONE ENCOUNTER
prescription sent to   [facility]/pharmacy #[id number] - [place] - [address]  [address] [place] [address]  Phone: [phone] Fax: [phone]

## 2025-02-27 NOTE — TELEPHONE ENCOUNTER
prescription sent to   [facility]/pharmacy #[id number] - [place] - [address]  [address] [place] [address]  Phone: [phone] Fax: [phone]

## 2025-06-02 ENCOUNTER — OFFICE VISIT (OUTPATIENT)
Dept: FAMILY MEDICINE | Facility: CLINIC | Age: 50
End: 2025-06-02
Payer: COMMERCIAL

## 2025-06-02 VITALS
HEART RATE: 72 BPM | DIASTOLIC BLOOD PRESSURE: 70 MMHG | WEIGHT: 187 LBS | SYSTOLIC BLOOD PRESSURE: 108 MMHG | BODY MASS INDEX: 30.05 KG/M2 | OXYGEN SATURATION: 97 % | HEIGHT: 66 IN

## 2025-06-02 DIAGNOSIS — G43.709 CHRONIC MIGRAINE WITHOUT AURA WITHOUT STATUS MIGRAINOSUS, NOT INTRACTABLE: ICD-10-CM

## 2025-06-02 DIAGNOSIS — Z76.0 MEDICATION REFILL: ICD-10-CM

## 2025-06-02 DIAGNOSIS — Z79.899 LONG-TERM USE OF HIGH-RISK MEDICATION: ICD-10-CM

## 2025-06-02 DIAGNOSIS — F33.9 RECURRENT MAJOR DEPRESSIVE DISORDER, REMISSION STATUS UNSPECIFIED: Primary | ICD-10-CM

## 2025-06-02 DIAGNOSIS — G89.29 CHRONIC RIGHT-SIDED LOW BACK PAIN WITHOUT SCIATICA: ICD-10-CM

## 2025-06-02 DIAGNOSIS — Z51.81 ENCOUNTER FOR THERAPEUTIC DRUG MONITORING: ICD-10-CM

## 2025-06-02 DIAGNOSIS — F41.9 ANXIETY: ICD-10-CM

## 2025-06-02 DIAGNOSIS — M54.50 CHRONIC RIGHT-SIDED LOW BACK PAIN WITHOUT SCIATICA: ICD-10-CM

## 2025-06-02 PROCEDURE — 99214 OFFICE O/P EST MOD 30 MIN: CPT | Mod: S$GLB,,, | Performed by: FAMILY MEDICINE

## 2025-06-02 PROCEDURE — 1159F MED LIST DOCD IN RCRD: CPT | Mod: CPTII,S$GLB,, | Performed by: FAMILY MEDICINE

## 2025-06-02 PROCEDURE — 3008F BODY MASS INDEX DOCD: CPT | Mod: CPTII,S$GLB,, | Performed by: FAMILY MEDICINE

## 2025-06-02 PROCEDURE — 1160F RVW MEDS BY RX/DR IN RCRD: CPT | Mod: CPTII,S$GLB,, | Performed by: FAMILY MEDICINE

## 2025-06-02 PROCEDURE — 3078F DIAST BP <80 MM HG: CPT | Mod: CPTII,S$GLB,, | Performed by: FAMILY MEDICINE

## 2025-06-02 PROCEDURE — 3074F SYST BP LT 130 MM HG: CPT | Mod: CPTII,S$GLB,, | Performed by: FAMILY MEDICINE

## 2025-06-02 RX ORDER — METHOCARBAMOL 750 MG/1
750 TABLET, FILM COATED ORAL 2 TIMES DAILY PRN
Qty: 60 TABLET | Refills: 5 | Status: SHIPPED | OUTPATIENT
Start: 2025-06-02

## 2025-06-02 RX ORDER — FLUTICASONE PROPIONATE 50 MCG
1 SPRAY, SUSPENSION (ML) NASAL DAILY
Qty: 18.2 ML | Refills: 3 | Status: SHIPPED | OUTPATIENT
Start: 2025-06-02

## 2025-06-02 RX ORDER — UBROGEPANT 50 MG/1
50 TABLET ORAL DAILY PRN
Qty: 10 TABLET | Refills: 2 | Status: SHIPPED | OUTPATIENT
Start: 2025-06-02

## 2025-06-02 RX ORDER — ALBUTEROL SULFATE 90 UG/1
1-2 INHALANT RESPIRATORY (INHALATION) EVERY 6 HOURS PRN
Qty: 18 G | Refills: 0 | Status: SHIPPED | OUTPATIENT
Start: 2025-06-02

## 2025-06-02 RX ORDER — ALPRAZOLAM 0.5 MG/1
0.5 TABLET ORAL 3 TIMES DAILY
Qty: 270 TABLET | Refills: 0 | Status: SHIPPED | OUTPATIENT
Start: 2025-06-02

## (undated) DEVICE — Device

## (undated) DEVICE — TUBING INSUFFLATION 10FT

## (undated) DEVICE — SUTURE MONOCRYL 4-0 PS-2 27 MCP426H

## (undated) DEVICE — PREP CHLORA 26ML

## (undated) DEVICE — SOLUTION ANTIFOG FOG1001

## (undated) DEVICE — SOLUTION IRRI NS BOTTLE 1000ML R5200-01

## (undated) DEVICE — TRAY SKIN PREP DRY

## (undated) DEVICE — SCISSORS 5MM APPLIED MEDICAL   CB030

## (undated) DEVICE — NEEDLE SPINAL 18GA 3 1/2 333350

## (undated) DEVICE — NEEDLE INSUFFLATION 120MM 172015

## (undated) DEVICE — GOWN X-LARGE 044674

## (undated) DEVICE — DISSECTOR KITTNER 13300

## (undated) DEVICE — SYRINGE 20ML 302830

## (undated) DEVICE — APPLICATOR FLEXITIP ARISTA  1 GR AM0005

## (undated) DEVICE — LIGASURE LF1837 (REPLACES LF1637)

## (undated) DEVICE — GLOVE BIOGEL PI ULTRA TOUCH G GRAY SZ 7

## (undated) DEVICE — TROCAR BLADED ZTHREAD 11MM X 100MM CTB33

## (undated) DEVICE — GOWN SMART LRG 044673

## (undated) DEVICE — UNDERGLOVE BIOGEL PI MICRO BLUE SZ 7.5

## (undated) DEVICE — UNDERGLOVE BIOGEL PI MICRO BLUE SZ 6.5

## (undated) DEVICE — PACK BASIC V 88151

## (undated) DEVICE — ADHESIVE TISSUE EXOFIN

## (undated) DEVICE — GLOVE BIOGEL MICRO SURGEON PINK SZ 7

## (undated) DEVICE — SUCTION/IRRIGATOR W/TIP

## (undated) DEVICE — TIP BOVIE TEFLON E1450X

## (undated) DEVICE — PACK LITHOTOMY 88521

## (undated) DEVICE — BANDAGE KERLIX   441106

## (undated) DEVICE — SUTURE VICRYL 3-0 SH 27 VCP416H

## (undated) DEVICE — SOLUTION SCRUB IODINE 4OZ

## (undated) DEVICE — BINDER 12 4-PANEL 30-45

## (undated) DEVICE — AGENT HEMOSTATIC ARISTA 3GR

## (undated) DEVICE — COVER LIGHT HANDLE LB53

## (undated) DEVICE — SLEEVE TROCAR 5MMX100MM  CTS02

## (undated) DEVICE — PACK LAPAROSCOPY I 88088

## (undated) DEVICE — SUTURE ETHILON 3-0 PS-2 18 1669H

## (undated) DEVICE — SUTURE VICRYL #0 27 UR-6 VCP603H

## (undated) DEVICE — SUTURE VICRYL 2-0 CT-1 36 VCP945H

## (undated) DEVICE — PENCIL BOVIE E2515H

## (undated) DEVICE — SUTURE VICRYL 0 CT-1 8-27 JJ31G

## (undated) DEVICE — SPONGE XRAY DETECTABLE 4X4

## (undated) DEVICE — SUTURE VICRYL 0 CT-1 36 J946H

## (undated) DEVICE — SYRINGE 20CC SLIP TIP 20ML 302831

## (undated) DEVICE — TIP BOVIE ENT 2.75      E1455

## (undated) DEVICE — BLADE SCALPEL #11 371111

## (undated) DEVICE — PAD MATERNITY

## (undated) DEVICE — GLOVE BIOGEL MICRO SURGEON PINK SZ 6.5

## (undated) DEVICE — SOLUTION PREP IODINE 4OZ

## (undated) DEVICE — BABCOCK WITH RATCHET HANDLE 5MM 5BB

## (undated) DEVICE — UNDERGLOVE BIOGEL PI MICRO BLUE SZ 7

## (undated) DEVICE — HEMOSTAT SURGICEL 4X8

## (undated) DEVICE — SET BASIN O R 31451126

## (undated) DEVICE — SUTURE VICRYL 2-0 SH 18 VCP775D

## (undated) DEVICE — JELLY LUBRICATING TUBE 4OZ 4OZLUB